# Patient Record
Sex: MALE | Race: WHITE | NOT HISPANIC OR LATINO | Employment: OTHER | URBAN - NONMETROPOLITAN AREA
[De-identification: names, ages, dates, MRNs, and addresses within clinical notes are randomized per-mention and may not be internally consistent; named-entity substitution may affect disease eponyms.]

---

## 2017-01-31 ENCOUNTER — OFFICE VISIT (OUTPATIENT)
Dept: MEDICAL GROUP | Facility: PHYSICIAN GROUP | Age: 57
End: 2017-01-31
Payer: COMMERCIAL

## 2017-01-31 VITALS
WEIGHT: 228 LBS | SYSTOLIC BLOOD PRESSURE: 116 MMHG | RESPIRATION RATE: 14 BRPM | DIASTOLIC BLOOD PRESSURE: 68 MMHG | HEIGHT: 64 IN | HEART RATE: 66 BPM | OXYGEN SATURATION: 95 % | TEMPERATURE: 98.3 F | BODY MASS INDEX: 38.93 KG/M2

## 2017-01-31 DIAGNOSIS — E11.59 TYPE 2 DIABETES MELLITUS WITH OTHER CIRCULATORY COMPLICATION: ICD-10-CM

## 2017-01-31 DIAGNOSIS — E55.9 MILD VITAMIN D DEFICIENCY: ICD-10-CM

## 2017-01-31 DIAGNOSIS — E78.5 HYPERLIPIDEMIA, UNSPECIFIED HYPERLIPIDEMIA TYPE: ICD-10-CM

## 2017-01-31 DIAGNOSIS — E87.6 HYPOKALEMIA: ICD-10-CM

## 2017-01-31 DIAGNOSIS — Z82.49 FAMILY HISTORY OF EARLY CAD: ICD-10-CM

## 2017-01-31 DIAGNOSIS — G45.9 TRANSIENT CEREBRAL ISCHEMIA, UNSPECIFIED TYPE: ICD-10-CM

## 2017-01-31 DIAGNOSIS — I10 ESSENTIAL HYPERTENSION: ICD-10-CM

## 2017-01-31 DIAGNOSIS — G47.30 SLEEP APNEA, UNSPECIFIED TYPE: ICD-10-CM

## 2017-01-31 PROCEDURE — 99214 OFFICE O/P EST MOD 30 MIN: CPT | Performed by: NURSE PRACTITIONER

## 2017-01-31 RX ORDER — SODIUM FLUORIDE 6 MG/ML
PASTE, DENTIFRICE DENTAL
Refills: 3 | COMMUNITY
Start: 2016-11-19 | End: 2021-08-11

## 2017-01-31 ASSESSMENT — PATIENT HEALTH QUESTIONNAIRE - PHQ9: CLINICAL INTERPRETATION OF PHQ2 SCORE: 0

## 2017-01-31 NOTE — MR AVS SNAPSHOT
"        Estiven Singer   2017 4:40 PM   Office Visit   MRN: [id number]    Department:  [facility]   Dept Phone:  [phone]    Description:  Male : 1960   Provider:  YOLY Steward           Reason for Visit     Hospital Follow-up TIA      Allergies as of 2017     No Known Allergies      You were diagnosed with     Essential hypertension   [8124747]       Type 2 diabetes mellitus with other circulatory complication (CMS-[facility])   [8873524]       Sleep apnea, unspecified type   [5380095]       Transient cerebral ischemia, unspecified type   [6426320]       Mild vitamin D deficiency   [256095]       Hypokalemia   [120356]       Hyperlipidemia, unspecified hyperlipidemia type   [2107633]         Vital Signs     Blood Pressure Pulse Temperature Respirations Height Weight    116/68 mmHg 66 36.8 °C (98.3 °F) 14 1.626 m (5' 4\") 103.42 kg (228 lb)    Body Mass Index Oxygen Saturation Smoking Status             39.12 kg/m2 95% Passive Smoke Exposure - Never Smoker         Basic Information     Date Of Birth Sex Race Ethnicity Preferred Language    1960 Male White Non- English      Your appointments     2017  7:30 AM   Adult Draw/Collection with [facility] (--)    [address] [place] [address]   [phone]              Problem List              ICD-10-CM Priority Class Noted - Resolved    HTN (hypertension) I10   2015 - Present    Type 2 diabetes mellitus (CMS-[facility]) E11.9   2015 - Present    Sleep apnea G47.30   2015 - Present    Family history of early CAD Z82.49   2015 - Present    Vertigo of central origin of both ears H81.43   2015 - Present    BMI 38.0-38.9,adult Z68.38   2015 - Present    Mild vitamin D deficiency E55.9   2015 - Present    Hyperlipemia E78.5   2015 - Present    TIA (transient ischemic attack) G45.9   2016 - Present    Hypokalemia E87.6   2016 - Present      Health Maintenance   "       Date Due Completion Dates    IMM HEP B VACCINE (1 of 3 - Primary Series) 1960 ---    DIABETES MONOFILAMENT / LE EXAM 1960 ---    RETINAL SCREENING 5/14/1980 ---    URINE ACR / MICROALBUMIN 5/14/1980 ---    IMM DTaP/Tdap/Td Vaccine (1 - Tdap) 5/14/1981 ---    IMM PNEUMOCOCCAL 19-64 (ADULT) MEDIUM RISK SERIES (1 of 1 - PPSV23) 5/14/1981 ---    COLONOSCOPY 5/14/2012 ---    A1C SCREENING 10/11/2015 4/11/2015, 10/18/2014, 7/18/2014, 4/26/2014, 9/17/2011    IMM INFLUENZA (1) 9/1/2016 ---    FASTING LIPID PROFILE 11/17/2017 11/17/2016, 10/18/2014, 7/18/2014, 1/25/2014, 9/28/2013, 5/25/2013, 10/27/2012, 7/20/2012, 4/13/2012, 1/20/2012, 9/17/2011, 6/18/2011    SERUM CREATININE 11/17/2017 11/17/2016, 4/11/2015, 10/18/2014, 7/18/2014, 4/26/2014, 1/25/2014, 1/18/2014, 9/28/2013, 5/25/2013, 10/27/2012, 7/20/2012, 4/13/2012, 1/20/2012, 9/17/2011, 6/18/2011, 5/14/2010            Current Immunizations     No immunizations on file.      Below and/or attached are the medications your provider expects you to take. Review all of your home medications and newly ordered medications with your provider and/or pharmacist. Follow medication instructions as directed by your provider and/or pharmacist. Please keep your medication list with you and share with your provider. Update the information when medications are discontinued, doses are changed, or new medications (including over-the-counter products) are added; and carry medication information at all times in the event of emergency situations     Allergies:  No Known Allergies          Medications  Valid as of: January 31, 2017 -  5:05 PM    Generic Name Brand Name Tablet Size Instructions for use    AmLODIPine Besylate (Tab) NORVASC 5 MG Take 1 Tab by mouth every day.        Aspirin (Tablet Delayed Response) Aspirin 325 MG Take 1 Tab by mouth every day.        Atorvastatin Calcium (Tab) LIPITOR 40 MG Take 1 Tab by mouth every bedtime.        Lisinopril (Tab) PRINIVIL 20  MG Take 1 Tab by mouth every day.        Sodium Fluoride (Paste) PREVIDENT 5000 BOOSTER PLUS 1.1 %         .                 Medicines prescribed today were sent to:     [id number] - [place], [place] - [address] AT [facility] [address] [facility] [address] [facility]    [address] [facility] [address] [place] [address]    Phone: [phone] Fax: [phone]    Open 24 Hours?: No    [facility] - [place], [place] - [address]    [address] [place] [address]    Phone: [phone] Fax: [phone]    Open 24 Hours?: No      Medication refill instructions:       If your prescription bottle indicates you have medication refills left, it is not necessary to call your provider’s office. Please contact your pharmacy and they will refill your medication.    If your prescription bottle indicates you do not have any refills left, you may request refills at any time through one of the following ways: The online Playdate App system (except Urgent Care), by calling your provider’s office, or by asking your pharmacy to contact your provider’s office with a refill request. Medication refills are processed only during regular business hours and may not be available until the next business day. Your provider may request additional information or to have a follow-up visit with you prior to refilling your medication.   *Please Note: Medication refills are assigned a new Rx number when refilled electronically. Your pharmacy may indicate that no refills were authorized even though a new prescription for the same medication is available at the pharmacy. Please request the medicine by name with the pharmacy before contacting your provider for a refill.        Your To Do List     Future Labs/Procedures Complete By Expires    COMP METABOLIC PANEL  As directed 1/31/2018    HEMOGLOBIN A1C  As directed 1/31/2018    VITAMIN D,25 HYDROXY  As directed 1/31/2018         Playdate App Access Code: [email or web address]  Expires: 3/2/2017 12:08 PM    Delfina ACEVEDO  secure, online tool to manage your health information     [employer] MediaV® is a secure, online tool that connects you to your personalized health information from the privacy of your home -- day or night - making it very easy for you to manage your healthcare. Once the activation process is completed, you can even access your medical information using the MediaV jazmyn, which is available for free in the Apple Jazmyn store or Google Play store.     MediaV provides the following levels of access (as shown below):   My Chart Features   [facility] Primary Care Doctor [facility]  Specialists [facility]  Urgent  Care [facility]  Primary Care  Doctor   Email your healthcare team securely and privately 24/7 X X X    Manage appointments: schedule your next appointment; view details of past/upcoming appointments X      Request prescription refills. X      View recent personal medical records, including lab and immunizations X X X X   View health record, including health history, allergies, medications X X X X   Read reports about your outpatient visits, procedures, consult and ER notes X X X X   See your discharge summary, which is a recap of your hospital and/or ER visit that includes your diagnosis, lab results, and care plan. X X       How to register for MediaV:  1. Go to  [email or web address].  2. Click on the Sign Up Now box, which takes you to the New Member Sign Up page. You will need to provide the following information:  a. Enter your MediaV Access Code exactly as it appears at the top of this page. (You will not need to use this code after you’ve completed the sign-up process. If you do not sign up before the expiration date, you must request a new code.)   b. Enter your date of birth.   c. Enter your home email address.   d. Click Submit, and follow the next screen’s instructions.  3. Create a MediaV ID. This will be your MediaV login ID and cannot be changed, so think of one that is secure and easy to  remember.  4. Create a Snyppit password. You can change your password at any time.  5. Enter your Password Reset Question and Answer. This can be used at a later time if you forget your password.   6. Enter your e-mail address. This allows you to receive e-mail notifications when new information is available in Snyppit.  7. Click Sign Up. You can now view your health information.    For assistance activating your Snyppit account, call [phone]

## 2017-02-01 NOTE — ASSESSMENT & PLAN NOTE
Patient has elevated cholesterol. Patient takes Lipitor 40 mg without side effects.   Diet is unchanged, but he knows that he needs to decrease fats and sugary sweets.

## 2017-02-01 NOTE — ASSESSMENT & PLAN NOTE
Patient reports a isolated episode, where he was watching basketball game, and all of a sudden his vision became blurry and he had one side numbness. He went to ER. Was admitted for observation.  MRI, CT, ECHO and labs were within normal limits for age and chronic elias conditions.  He was told it was probably a TIA.

## 2017-02-01 NOTE — ASSESSMENT & PLAN NOTE
Symptoms:  Headache no , Weakness no , Visual loss no , Chest pain no , Dyspnea no , Claudication no Swelling no   Taking medication Norvasc 5 mg, Lisinopril 20 mg  Diet Standard American Diet. Working on lower carb diet  Exercise: walking in class room

## 2017-02-01 NOTE — ASSESSMENT & PLAN NOTE
Patient was told he may have been diabetic. He was on Metformin at one time.  Eye exam up to date.  No neuropathy  Non smoking  On statin.

## 2017-02-01 NOTE — ASSESSMENT & PLAN NOTE
Patient was hospitalized in November 2016 for blurred vision and nunbness. He eventually had ECHO. Results show 65% EF. Mild calcification on mitral valve. Other wise normal.  No chest pain. No sob. No weakness. No palpitations.

## 2017-02-01 NOTE — PROGRESS NOTES
Chief Complaint   Patient presents with   • Hospital Follow-up     TIA       HISTORY OF PRESENT ILLNESS: Patient is a 56 y.o. male  Patient, who presents today to discuss:    Interval history: ER and overnight stay in hospital.     Review of Systems   Constitutional: Negative for fever, chills, weight loss and malaise/fatigue.   HENT: Negative for ear pain, nosebleeds, congestion, sore throat and neck pain.    Eyes: Negative for blurred vision.   Respiratory: Negative for cough, sputum production, shortness of breath and wheezing.    Cardiovascular: Negative for chest pain, palpitations, orthopnea and leg swelling.   Gastrointestinal: Negative for heartburn, nausea, vomiting and abdominal pain.   Genitourinary: Negative for dysuria, urgency and frequency.   Musculoskeletal: Negative for myalgias, back pain and joint pain.   Skin: Negative for rash and itching.   Neurological: Negative for dizziness, tingling, tremors, sensory change, focal weakness and headaches.   Endo/Heme/Allergies: Does not bruise/bleed easily.   Psychiatric/Behavioral: Negative for depression, anxiety, or memory loss.       HTN (hypertension)  Symptoms:  Headache no , Weakness no , Visual loss no , Chest pain no , Dyspnea no , Claudication no Swelling no   Taking medication Norvasc 5 mg, Lisinopril 20 mg  Diet Standard American Diet. Working on lower carb diet  Exercise: walking in class room      Type 2 diabetes mellitus  Patient was told he may have been diabetic. He was on Metformin at one time.  Eye exam up to date.  No neuropathy  Non smoking  On statin.    Sleep apnea  Patient has current new bed. Patient is using cpap.    TIA (transient ischemic attack)  Patient reports a isolated episode, where he was watching basketball game, and all of a sudden his vision became blurry and he had one side numbness. He went to ER. Was admitted for observation.  MRI, CT, ECHO and labs were within normal limits for age and chronic elias conditions.  He  was told it was probably a TIA.     Mild vitamin D deficiency  Patient is taking vitamin D. Will obtain labs.     Hypokalemia  Patient had history of low potassium    Hyperlipemia  Patient has elevated cholesterol. Patient takes Lipitor 40 mg without side effects.   Diet is unchanged, but he knows that he needs to decrease fats and sugary sweets.     Family history of early CAD  Patient was hospitalized in 2016 for blurred vision and nunbness. He eventually had ECHO. Results show 65% EF. Mild calcification on mitral valve. Other wise normal.  No chest pain. No sob. No weakness. No palpitations.     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         Allergies:Review of patient's allergies indicates no known allergies.    Current Outpatient Prescriptions Ordered in [facility]   Medication Sig Dispense Refill   • aspirin  MG Tablet Delayed Response Take 1 Tab by mouth every day. 60 Tab 3   • atorvastatin (LIPITOR) 40 MG Tab Take 1 Tab by mouth every bedtime. 30 Tab 11   • lisinopril (PRINIVIL) 20 MG Tab Take 1 Tab by mouth every day. 30 Tab 11   • amlodipine (NORVASC) 5 MG Tab Take 1 Tab by mouth every day. 30 Tab 11   • PREVIDENT 5000 BOOSTER PLUS 1.1 % Paste   3     No current [facility]-ordered facility-administered medications on file.       Past Medical History   Diagnosis Date   • Hypertension    • Snoring    • Pain    • Type 2 diabetes mellitus (CMS-[facility]) 2015   • KEVIN (obstructive sleep apnea) 2015   • Vertigo of central origin of both ears 2015   • Hyperlipemia 2015   • Pneumonia      RESOLVED       Social History   Substance Use Topics   • Smoking status: Passive Smoke Exposure - Never Smoker   • Smokeless tobacco: Never Used   • Alcohol Use: 0.0 oz/week     0 Standard drinks or equivalent per week      Comment: seldom       Family Status   Relation Status Death Age   • Mother       MI 50 's  "  • Father     • Maternal Aunt     • Maternal Uncle       Family History   Problem Relation Age of Onset   • Heart Disease Mother    • Cancer Father    • Heart Disease Maternal Aunt    • Heart Disease Maternal Uncle        ROS: As documented in my HPI      Exam:  Blood pressure 116/68, pulse 66, temperature 36.8 °C (98.3 °F), resp. rate 14, height 1.626 m (5' 4\"), weight 103.42 kg (228 lb), SpO2 95 %.  General:  Well nourished, well developed male in NAD  Head: No lesions, Non tender scalp  Neck: Supple. Symmetric   Pulmonary:  Normal effort. No rales, ronchi, or wheezing.  Cardiovascular: Regular rate and rhythm without murmur.   Abdomen: Obese, soft nontender and bowel sounds present.   Extremities: no clubbing, cyanosis, or edema.  Psych: Alert and oriented x3. Normal mood and affect.   Neurological: No focal deficits  No foot exam today    Please note that this dictation was created using voice recognition software. I have made every reasonable attempt to correct obvious errors, but I expect that there are errors of grammar and possibly content that I did not discover before finalizing the note.    Assessment/Plan:  1. Essential hypertension  COMP METABOLIC PANEL    LIPID PANEL    VITAMIN D,25 HYDROXY    TSH+FREE T4    HEMOGLOBIN A1C    MICROALB/CREAT RATIO RAND. UR   2. Type 2 diabetes mellitus with other circulatory complication (HCC)  COMP METABOLIC PANEL    LIPID PANEL    VITAMIN D,25 HYDROXY    TSH+FREE T4    HEMOGLOBIN A1C    MICROALB/CREAT RATIO RAND. UR   3. Sleep apnea, unspecified type  COMP METABOLIC PANEL    LIPID PANEL    VITAMIN D,25 HYDROXY    TSH+FREE T4    HEMOGLOBIN A1C    MICROALB/CREAT RATIO RAND. UR   4. Transient cerebral ischemia, unspecified type  COMP METABOLIC PANEL    LIPID PANEL    VITAMIN D,25 HYDROXY    TSH+FREE T4    HEMOGLOBIN A1C    MICROALB/CREAT RATIO RAND. UR   5. Mild vitamin D deficiency  COMP METABOLIC PANEL    LIPID PANEL    VITAMIN D,25 HYDROXY    " TSH+FREE T4    HEMOGLOBIN A1C    MICROALB/CREAT RATIO RAND. UR   6. Hypokalemia  COMP METABOLIC PANEL    LIPID PANEL    VITAMIN D,25 HYDROXY    TSH+FREE T4    HEMOGLOBIN A1C    MICROALB/CREAT RATIO RAND. UR   7. Hyperlipidemia, unspecified hyperlipidemia type  COMP METABOLIC PANEL    LIPID PANEL    VITAMIN D,25 HYDROXY    TSH+FREE T4    HEMOGLOBIN A1C    MICROALB/CREAT RATIO RAND. UR   8. Family history of early CAD  Normal Echo   9. BMI 38.0-38.9,adult  Discussed diet and exercise

## 2017-02-01 NOTE — ASSESSMENT & PLAN NOTE
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

## 2017-02-11 ENCOUNTER — HOSPITAL ENCOUNTER (OUTPATIENT)
Dept: LAB | Facility: MEDICAL CENTER | Age: 57
End: 2017-02-11
Attending: NURSE PRACTITIONER
Payer: COMMERCIAL

## 2017-02-11 DIAGNOSIS — E78.5 HYPERLIPIDEMIA, UNSPECIFIED HYPERLIPIDEMIA TYPE: ICD-10-CM

## 2017-02-11 DIAGNOSIS — E87.6 HYPOKALEMIA: ICD-10-CM

## 2017-02-11 DIAGNOSIS — E55.9 MILD VITAMIN D DEFICIENCY: ICD-10-CM

## 2017-02-11 DIAGNOSIS — G47.30 SLEEP APNEA, UNSPECIFIED TYPE: ICD-10-CM

## 2017-02-11 DIAGNOSIS — E11.59 TYPE 2 DIABETES MELLITUS WITH OTHER CIRCULATORY COMPLICATION: ICD-10-CM

## 2017-02-11 DIAGNOSIS — G45.9 TRANSIENT CEREBRAL ISCHEMIA, UNSPECIFIED TYPE: ICD-10-CM

## 2017-02-11 DIAGNOSIS — I10 ESSENTIAL HYPERTENSION: ICD-10-CM

## 2017-02-11 LAB
25(OH)D3 SERPL-MCNC: 15 NG/ML (ref 30–100)
ALBUMIN SERPL BCP-MCNC: 4.2 G/DL (ref 3.2–4.9)
ALBUMIN/GLOB SERPL: 1.5 G/DL
ALP SERPL-CCNC: 73 U/L (ref 30–99)
ALT SERPL-CCNC: 18 U/L (ref 2–50)
ANION GAP SERPL CALC-SCNC: 6 MMOL/L (ref 0–11.9)
AST SERPL-CCNC: 16 U/L (ref 12–45)
BILIRUB SERPL-MCNC: 0.6 MG/DL (ref 0.1–1.5)
BUN SERPL-MCNC: 16 MG/DL (ref 8–22)
CALCIUM SERPL-MCNC: 9.5 MG/DL (ref 8.5–10.5)
CHLORIDE SERPL-SCNC: 107 MMOL/L (ref 96–112)
CHOLEST SERPL-MCNC: 119 MG/DL (ref 100–199)
CO2 SERPL-SCNC: 25 MMOL/L (ref 20–33)
CREAT SERPL-MCNC: 0.91 MG/DL (ref 0.5–1.4)
CREAT UR-MCNC: 378.6 MG/DL
EST. AVERAGE GLUCOSE BLD GHB EST-MCNC: 131 MG/DL
GLOBULIN SER CALC-MCNC: 2.8 G/DL (ref 1.9–3.5)
GLUCOSE SERPL-MCNC: 136 MG/DL (ref 65–99)
HBA1C MFR BLD: 6.2 % (ref 0–5.6)
HDLC SERPL-MCNC: 40 MG/DL
LDLC SERPL CALC-MCNC: 66 MG/DL
MICROALBUMIN UR-MCNC: 1.4 MG/DL
MICROALBUMIN/CREAT UR: 4 MG/G (ref 0–30)
POTASSIUM SERPL-SCNC: 4.2 MMOL/L (ref 3.6–5.5)
PROT SERPL-MCNC: 7 G/DL (ref 6–8.2)
SODIUM SERPL-SCNC: 138 MMOL/L (ref 135–145)
T4 FREE SERPL-MCNC: 0.77 NG/DL (ref 0.53–1.43)
TRIGL SERPL-MCNC: 63 MG/DL (ref 0–149)
TSH SERPL DL<=0.005 MIU/L-ACNC: 1.34 UIU/ML (ref 0.3–3.7)

## 2017-02-11 PROCEDURE — 82043 UR ALBUMIN QUANTITATIVE: CPT

## 2017-02-11 PROCEDURE — 82570 ASSAY OF URINE CREATININE: CPT

## 2017-02-11 PROCEDURE — 82306 VITAMIN D 25 HYDROXY: CPT

## 2017-02-11 PROCEDURE — 84439 ASSAY OF FREE THYROXINE: CPT

## 2017-02-11 PROCEDURE — 80061 LIPID PANEL: CPT

## 2017-02-11 PROCEDURE — 36415 COLL VENOUS BLD VENIPUNCTURE: CPT

## 2017-02-11 PROCEDURE — 83036 HEMOGLOBIN GLYCOSYLATED A1C: CPT

## 2017-02-11 PROCEDURE — 80053 COMPREHEN METABOLIC PANEL: CPT

## 2017-02-11 PROCEDURE — 84443 ASSAY THYROID STIM HORMONE: CPT

## 2017-02-12 ENCOUNTER — TELEPHONE (OUTPATIENT)
Dept: MEDICAL GROUP | Facility: PHYSICIAN GROUP | Age: 57
End: 2017-02-12

## 2017-02-13 NOTE — TELEPHONE ENCOUNTER
Reviewed labs.   Stable cholesterol, kidney and liver function.  VITAMIN D level is at 15, please supplement with vitamin D.  Thyroid is normal.    Please notify patient.

## 2017-02-17 ENCOUNTER — TELEPHONE (OUTPATIENT)
Dept: MEDICAL GROUP | Facility: PHYSICIAN GROUP | Age: 57
End: 2017-02-17

## 2017-06-15 ENCOUNTER — SLEEP CENTER VISIT (OUTPATIENT)
Dept: SLEEP MEDICINE | Facility: MEDICAL CENTER | Age: 57
End: 2017-06-15
Payer: COMMERCIAL

## 2017-06-15 VITALS
RESPIRATION RATE: 16 BRPM | SYSTOLIC BLOOD PRESSURE: 126 MMHG | DIASTOLIC BLOOD PRESSURE: 84 MMHG | WEIGHT: 231 LBS | HEIGHT: 64 IN | BODY MASS INDEX: 39.44 KG/M2 | OXYGEN SATURATION: 97 % | TEMPERATURE: 98.6 F | HEART RATE: 89 BPM

## 2017-06-15 DIAGNOSIS — G47.33 OSA (OBSTRUCTIVE SLEEP APNEA): ICD-10-CM

## 2017-06-15 PROCEDURE — 99213 OFFICE O/P EST LOW 20 MIN: CPT | Performed by: NURSE PRACTITIONER

## 2017-06-15 NOTE — PROGRESS NOTES
Chief Complaint   Patient presents with   • Apnea     Last Seen 11/15         HPI:  This is a 57 y.o. male with a history of obstructive sleep apnea.  Polysomnogram indicates AHI 76 and minimum saturation 74%. The patient is compliant with APAP 8-15 centimeters. Compliance dated 5/13-6/11/17 indicate 73% usage for 4 hours 48 minutes. Age has been reduced to 2.5. Average pressure is 13.5 cm. The patient reports being rested and benefiting from therapy. There've been no changes to his health in the past 2 years. He denies early morning headaches and lower extremity edema.    Past Medical History   Diagnosis Date   • Hypertension    • Snoring    • Pain    • Type 2 diabetes mellitus (CMS-[facility]) 1/8/2015   • KEVIN (obstructive sleep apnea) 1/8/2015   • Vertigo of central origin of both ears 1/9/2015   • Hyperlipemia 4/29/2015   • Pneumonia      RESOLVED       Past Surgical History   Procedure Laterality Date   • Shoulder arthroscopy  5/20/2010     Performed by XIMENA KANG at SURGERY SAME DAY [facility]   • Debridement  5/20/2010     Performed by XIMENA KANG at SURGERY SAME DAY [facility]   • Rotator cuff repair  5/20/2010     Performed by XIMENA KANG at SURGERY SAME DAY [facility]       Social History   Substance Use Topics   • Smoking status: Passive Smoke Exposure - Never Smoker   • Smokeless tobacco: Never Used   • Alcohol Use: 0.0 oz/week     0 Standard drinks or equivalent per week      Comment: seldom       ROS:   Constitutional: Denies fevers, chills, sweats, fatigue, and weight loss.  Eyes: Glasses.  Ears/nose/mouth/throat: Denies injury.  Cardiovascular: Denies chest pain, tightness.  Respiratory: Denies shortness of breath, cough, sputum, wheezing, hemoptysis.  GI: Denies heartburn, difficulty swallowing, nausea, and vomiting.  Neurological: Denies frequent headaches, dizziness, weakness.  Sleep: See history of present illness.    Vitals:  Filed Vitals:    06/15/17 1354   Height: 1.626 m  "(5' 4\")   Weight: 104.781 kg (231 lb)   Weight % change since last entry.: 0 %   BP: 126/84   Pulse: 89   BMI (Calculated): 39.65   Resp: 16   Temp: 37 °C (98.6 °F)   O2 sat % room air: 97 %     Allergies:  Review of patient's allergies indicates no known allergies.    Medications.  Current Outpatient Prescriptions   Medication Sig Dispense Refill   • PREVIDENT 5000 BOOSTER PLUS 1.1 % Paste   3   • aspirin  MG Tablet Delayed Response Take 1 Tab by mouth every day. 60 Tab 3   • atorvastatin (LIPITOR) 40 MG Tab Take 1 Tab by mouth every bedtime. 30 Tab 11   • lisinopril (PRINIVIL) 20 MG Tab Take 1 Tab by mouth every day. 30 Tab 11   • amlodipine (NORVASC) 5 MG Tab Take 1 Tab by mouth every day. 30 Tab 11     No current facility-administered medications for this visit.       PHYSICAL EXAM:  Appearance: Well-developed, well-nourished, no acute distress.  Eyes. PERRL.  Hearing: Grossly intact.  Oropharynx: Tongue normal, posterior pharynx without erythema or exudate.  Respiratory effort: No intercostal retractions or use of accessory muscles.  Lung auscultation: No crackles, wheezing.  Heart auscultation: No murmur, gallop, or rub. Regular rate and rhythm.  Extremities: No cyanosis or edema.  Gait and Station: Normal  Orientation: Oriented to time, place, and person.    Assessment:  No diagnosis found.      Plan:  1. Continue APAP 8-15 centimeters.  2. Clean equipment weekly replacement supplies as a insurance.  3. Mask and supplies to benefit medical.    Return in about 1 year (around 6/15/2018) for With ALFA Escobar.      "

## 2017-06-15 NOTE — MR AVS SNAPSHOT
"Estiven Singer   6/15/2017 2:00 PM   Sleep Center Visit   MRN: [id number]    Department:  [facility]   Dept Phone:  [phone]    Description:  Male : 1960   Provider:  YOLY Suarez           Reason for Visit     Apnea Last Seen 11/15      Allergies as of 6/15/2017     No Known Allergies      You were diagnosed with     KEVIN (obstructive sleep apnea)   [180886]         Vital Signs     Blood Pressure Pulse Temperature Respirations Height Weight    126/84 mmHg 89 37 °C (98.6 °F) 16 1.626 m (5' 4\") 104.781 kg (231 lb)    Body Mass Index Oxygen Saturation Smoking Status             39.63 kg/m2 97% Passive Smoke Exposure - Never Smoker         Basic Information     Date Of Birth Sex Race Ethnicity Preferred Language    1960 Male White Non- English      Your appointments     2018 10:20 AM   Follow UP with YOLY Suarez   [facility] (--)    [address] [place] [id number] [phone]              Problem List              ICD-10-CM Priority Class Noted - Resolved    HTN (hypertension) I10 High  2015 - Present    Type 2 diabetes mellitus (CMS-HCC) E11.9   2015 - Present    KEVIN (obstructive sleep apnea) G47.33   2015 - Present    Family history of early CAD Z82.49 Medium  2015 - Present    Vertigo of central origin of both ears H81.43 Medium  2015 - Present    BMI 38.0-38.9,adult Z68.38 Medium  2015 - Present    Mild vitamin D deficiency E55.9   2015 - Present    Hyperlipemia E78.5 High  2015 - Present    TIA (transient ischemic attack) G45.9   2016 - Present    Hypokalemia E87.6   2016 - Present      Health Maintenance        Date Due Completion Dates    IMM HEP B VACCINE (1 of 3 - Primary Series) 1960 ---    DIABETES MONOFILAMENT / LE EXAM 1960 ---    RETINAL SCREENING 1980 ---    IMM DTaP/Tdap/Td Vaccine (1 - Tdap) 1981 ---    IMM PNEUMOCOCCAL 19- " (ADULT) MEDIUM RISK SERIES (1 of 1 - PPSV23) 5/14/1981 ---    COLONOSCOPY 5/14/2012 ---    A1C SCREENING 8/11/2017 2/11/2017, 4/11/2015, 10/18/2014, 7/18/2014, 4/26/2014, 9/17/2011    FASTING LIPID PROFILE 2/11/2018 2/11/2017, 11/17/2016, 10/18/2014, 7/18/2014, 1/25/2014, 9/28/2013, 5/25/2013, 10/27/2012, 7/20/2012, 4/13/2012, 1/20/2012, 9/17/2011, 6/18/2011    URINE ACR / MICROALBUMIN 2/11/2018 2/11/2017    SERUM CREATININE 2/11/2018 2/11/2017, 11/17/2016, 4/11/2015, 10/18/2014, 7/18/2014, 4/26/2014, 1/25/2014, 1/18/2014, 9/28/2013, 5/25/2013, 10/27/2012, 7/20/2012, 4/13/2012, 1/20/2012, 9/17/2011, 6/18/2011, 5/14/2010            Current Immunizations     No immunizations on file.      Below and/or attached are the medications your provider expects you to take. Review all of your home medications and newly ordered medications with your provider and/or pharmacist. Follow medication instructions as directed by your provider and/or pharmacist. Please keep your medication list with you and share with your provider. Update the information when medications are discontinued, doses are changed, or new medications (including over-the-counter products) are added; and carry medication information at all times in the event of emergency situations     Allergies:  No Known Allergies          Medications  Valid as of: Catie 15, 2017 -  2:17 PM    Generic Name Brand Name Tablet Size Instructions for use    AmLODIPine Besylate (Tab) NORVASC 5 MG Take 1 Tab by mouth every day.        Aspirin (Tablet Delayed Response) Aspirin 325 MG Take 1 Tab by mouth every day.        Atorvastatin Calcium (Tab) LIPITOR 40 MG Take 1 Tab by mouth every bedtime.        Lisinopril (Tab) PRINIVIL 20 MG Take 1 Tab by mouth every day.        Sodium Fluoride (Paste) PREVIDENT 5000 BOOSTER PLUS 1.1 %         .                 Medicines prescribed today were sent to:     [id number] - [place], [place] - [address] [facility] [address]  [address] [place] [address]    Phone: [phone] Fax: [phone]    Open 24 Hours?: No      Medication refill instructions:       If your prescription bottle indicates you have medication refills left, it is not necessary to call your provider’s office. Please contact your pharmacy and they will refill your medication.    If your prescription bottle indicates you do not have any refills left, you may request refills at any time through one of the following ways: The online OneCubicle system (except Urgent Care), by calling your provider’s office, or by asking your pharmacy to contact your provider’s office with a refill request. Medication refills are processed only during regular business hours and may not be available until the next business day. Your provider may request additional information or to have a follow-up visit with you prior to refilling your medication.   *Please Note: Medication refills are assigned a new Rx number when refilled electronically. Your pharmacy may indicate that no refills were authorized even though a new prescription for the same medication is available at the pharmacy. Please request the medicine by name with the pharmacy before contacting your provider for a refill.        Instructions    1. Continue APAP 8-15 centimeters.  2. Clean equipment weekly replacement supplies as a insurance.  3. Mask and supplies to benefit medical.            OneCubicle Access Code: [email or web address]  Expires: 7/14/2017 10:52 AM    OneCubicle  A secure, online tool to manage your health information     [employer]’s OneCubicle® is a secure, online tool that connects you to your personalized health information from the privacy of your home -- day or night - making it very easy for you to manage your healthcare. Once the activation process is completed, you can even access your medical information using the OneCubicle jazmyn, which is available for free in the Apple Jazmyn store or Google Play  store.     Graematter provides the following levels of access (as shown below):   My Chart Features   [facility] Doctor [facility]  Specialists [facility]  Urgent  Care Non-[facility]  Primary Care  Doctor   Email your healthcare team securely and privately 24/7 X X X    Manage appointments: schedule your next appointment; view details of past/upcoming appointments X      Request prescription refills. X      View recent personal medical records, including lab and immunizations X X X X   View health record, including health history, allergies, medications X X X X   Read reports about your outpatient visits, procedures, consult and ER notes X X X X   See your discharge summary, which is a recap of your hospital and/or ER visit that includes your diagnosis, lab results, and care plan. X X       How to register for Graematter:  1. Go to  [email or web address].  2. Click on the Sign Up Now box, which takes you to the New Member Sign Up page. You will need to provide the following information:  a. Enter your Graematter Access Code exactly as it appears at the top of this page. (You will not need to use this code after you’ve completed the sign-up process. If you do not sign up before the expiration date, you must request a new code.)   b. Enter your date of birth.   c. Enter your home email address.   d. Click Submit, and follow the next screen’s instructions.  3. Create a Graematter ID. This will be your Graematter login ID and cannot be changed, so think of one that is secure and easy to remember.  4. Create a Graematter password. You can change your password at any time.  5. Enter your Password Reset Question and Answer. This can be used at a later time if you forget your password.   6. Enter your e-mail address. This allows you to receive e-mail notifications when new information is available in Graematter.  7. Click Sign Up. You can now view your health information.    For assistance activating your Graematter account, call [phone]

## 2017-06-15 NOTE — PATIENT INSTRUCTIONS
1. Continue APAP 8-15 centimeters.  2. Clean equipment weekly replacement supplies as a insurance.  3. Mask and supplies to benefit medical.

## 2017-11-28 NOTE — ASSESSMENT & PLAN NOTE
Patient has known htn. He is prescribed norvasc 5 mg and lisinopril 20 mg daily. Patient is due for updated CMP  Symptoms:  Headache no , Weakness no , Visual loss, Chest pain no , Dyspnea no , Claudication no Swelling no   Taking medication yes - norvasc - lisinopril   Diet Standard American Diet   Exercise: working at school

## 2017-11-28 NOTE — ASSESSMENT & PLAN NOTE
Patient has history of type 2 diabetes and does not take medications. Will recheck A1c  MEDICATIONS:  DIET only     HGBA1C -  6.6 - without medications. Planning to start checking blood sugars and will report.     Meter?   No     Do you need refills of lancets, strips no     L : D take a statin for your cholesterol yes     U:  Last urine micro due     C: The you have circulation problems no  Pain no     O: Last eye exam 2017     S: Smoker no     E: Exercise active  ACE inhibitor yes.     Monitor is ordered. Patient to obtain fasting and pp blood sugars.

## 2017-11-28 NOTE — ASSESSMENT & PLAN NOTE
Patient has chronic hyperlipidemia. He is prescribed Lipitor 40 mg daily. Need new profile and CMP.

## 2017-11-29 ENCOUNTER — OFFICE VISIT (OUTPATIENT)
Dept: MEDICAL GROUP | Facility: PHYSICIAN GROUP | Age: 57
End: 2017-11-29
Payer: COMMERCIAL

## 2017-11-29 VITALS
OXYGEN SATURATION: 96 % | HEIGHT: 64 IN | TEMPERATURE: 98.6 F | RESPIRATION RATE: 20 BRPM | BODY MASS INDEX: 38.99 KG/M2 | HEART RATE: 76 BPM | DIASTOLIC BLOOD PRESSURE: 86 MMHG | SYSTOLIC BLOOD PRESSURE: 128 MMHG | WEIGHT: 228.4 LBS

## 2017-11-29 DIAGNOSIS — G45.9 TRANSIENT CEREBRAL ISCHEMIA, UNSPECIFIED TYPE: ICD-10-CM

## 2017-11-29 DIAGNOSIS — I10 ESSENTIAL HYPERTENSION: ICD-10-CM

## 2017-11-29 DIAGNOSIS — Z12.5 SCREENING FOR PROSTATE CANCER: ICD-10-CM

## 2017-11-29 DIAGNOSIS — E78.5 HYPERLIPIDEMIA, UNSPECIFIED HYPERLIPIDEMIA TYPE: ICD-10-CM

## 2017-11-29 DIAGNOSIS — E55.9 MILD VITAMIN D DEFICIENCY: ICD-10-CM

## 2017-11-29 DIAGNOSIS — E11.9 TYPE 2 DIABETES MELLITUS WITHOUT COMPLICATION, WITHOUT LONG-TERM CURRENT USE OF INSULIN (HCC): ICD-10-CM

## 2017-11-29 DIAGNOSIS — Z12.11 ENCOUNTER FOR FECAL IMMUNOCHEMICAL TEST SCREENING: ICD-10-CM

## 2017-11-29 PROCEDURE — 99214 OFFICE O/P EST MOD 30 MIN: CPT | Performed by: NURSE PRACTITIONER

## 2017-11-29 RX ORDER — LANCETS 30 GAUGE
EACH MISCELLANEOUS
Qty: 100 EACH | Refills: 3 | Status: SHIPPED | OUTPATIENT
Start: 2017-11-29 | End: 2021-08-11

## 2017-11-29 NOTE — PROGRESS NOTES
Chief Complaint   Patient presents with   • Medication Refill     no concerns-medication working great       HISTORY OF PRESENT ILLNESS: Patient is a @ age 57 here  today to discuss:     Interval history:     Hospitalizations  : none     Injuries : none     Illness  : none        HTN (hypertension)  Patient has known htn. He is prescribed norvasc 5 mg and lisinopril 20 mg daily. Patient is due for updated CMP  Symptoms:  Headache no , Weakness no , Visual loss, Chest pain no , Dyspnea no , Claudication no Swelling no   Taking medication yes - norvasc - lisinopril   Diet Standard American Diet   Exercise: working at school       Hyperlipemia  Patient has chronic hyperlipidemia. He is prescribed Lipitor 40 mg daily. Need new profile and CMP.     Type 2 diabetes mellitus  Patient has history of type 2 diabetes and does not take medications. Will recheck A1c  MEDICATIONS:  DIET only     HGBA1C -  6.6 - without medications. Planning to start checking blood sugars and will report.     Meter?   No     Do you need refills of lancets, strips no     L : D take a statin for your cholesterol yes     U:  Last urine micro due     C: The you have circulation problems no  Pain no     O: Last eye exam 2017     S: Smoker no     E: Exercise active  ACE inhibitor yes.     Monitor is ordered. Patient to obtain fasting and pp blood sugars.       Mild vitamin D deficiency  Patient has history of vitamin D insufficiency. Will need to recheck.     TIA (transient ischemic attack)  Patient takes one asa 325 daily. No headache. No confusion. No weakness.    Patient declines FLU VACCINE. Reviewed .     Allergies:Patient has no known allergies.    Current Outpatient Prescriptions Ordered in [facility]   Medication Sig Dispense Refill   • Blood Glucose Monitoring Suppl Device Meter: Dispense Device of Insurance Preference. Sig. Use as directed for blood sugar monitoring. #1. NR. 1 Device 0   • Blood Glucose Monitoring Suppl Supplies Misc Test strips  "order: Test strips for patient's meter. Sig: use tid and prn ssx high or low sugar #100 RF x 3 100 Strip 3   • Lancets Misc Lancets order: Lancets for  meter. Sig: use fasting and prn ssx high or low sugar. #100 RF x 3 100 Each 3   • PREVIDENT 5000 BOOSTER PLUS 1.1 % Paste   3   • aspirin  MG Tablet Delayed Response Take 1 Tab by mouth every day. 60 Tab 3   • atorvastatin (LIPITOR) 40 MG Tab Take 1 Tab by mouth every bedtime. 30 Tab 11   • lisinopril (PRINIVIL) 20 MG Tab Take 1 Tab by mouth every day. 30 Tab 11   • amlodipine (NORVASC) 5 MG Tab Take 1 Tab by mouth every day. 30 Tab 11     No current [facility]-ordered facility-administered medications on file.        Past Medical History:   Diagnosis Date   • Hyperlipemia 2015   • Hypertension    • KEVIN (obstructive sleep apnea) 2015   • Pain    • Pneumonia     RESOLVED   • Snoring    • Type 2 diabetes mellitus (CMS-HCC) 2015   • Vertigo of central origin of both ears 2015       Social History   Substance Use Topics   • Smoking status: Passive Smoke Exposure - Never Smoker   • Smokeless tobacco: Never Used   • Alcohol use 0.6 oz/week     1 Glasses of wine per week      Comment: seldom       Family Status   Relation Status   • Mother     MI 50 's   • Father    • Maternal Aunt    • Maternal Uncle      Family History   Problem Relation Age of Onset   • Heart Disease Mother    • Cancer Father    • Heart Disease Maternal Aunt    • Heart Disease Maternal Uncle        ROS: As documented in my HPI      Exam:  Blood pressure 128/86, pulse 76, temperature 37 °C (98.6 °F), resp. rate 20, height 1.626 m (5' 4\"), weight 103.6 kg (228 lb 6.4 oz), SpO2 96 %.  General:  Well nourished, well developed male in NAD  Head: Nontender scalp. No lesions  Neck: Supple. Symmetric Thyroid palpated. No bruits  Pulmonary:  Normal effort. No rales, ronchi, or wheezing.  Cardiovascular: Regular rate and rhythm without murmur.   Abdomen: Soft " nontender, normal bowel sounds  Extremities: no clubbing, cyanosis, or edema.  Psych: Alert and oriented x3.  Monofilament testing with a 10 gram force: sensation: intact bilaterally  Visual Inspection: Feet without maceration, ulcers, or fissures.  Pedal pulses: intact bilaterally   Left foot with bunion  Neurological: No focal deficits    Please note that this dictation was created using voice recognition software. I have made every reasonable attempt to correct obvious errors, but I expect that there are errors of grammar and possibly content that I did not discover before finalizing the note.    Assessment/Plan:  1. Essential hypertension - Current status of condition is chronic and controlled on therapy.   COMP METABOLIC PANEL   2. Hyperlipidemia, unspecified hyperlipidemia type  LIPID PANEL    VITAMIN D,25 HYDROXY   3. Type 2 diabetes mellitus without complication, without long-term current use of insulin (CMS-[facility]) - Current status of condition is chronic and controlled on therapy.   TSH+FREE T4    POCT  A1C     DIABETES FOOT EXAM DONE   4. Mild vitamin D deficiency  VITAMIN D,25 HYDROXY    TSH+FREE T4   5. Screening for prostate cancer  PROSTATE SPECIFIC AG SCREENING   6. Encounter for fecal immunochemical test screening  OCCULT BLOOD FECES IMMUNOASSAY   7. Transient cerebral ischemia, unspecified type  No episodes

## 2017-11-29 NOTE — PATIENT INSTRUCTIONS
Please start checking blood sugars.     In AM fasting should be less than 100    AFTER Meals 1-2 hours less than 150.    Return in 3 months with your readings.

## 2017-11-30 NOTE — TELEPHONE ENCOUNTER
Was the patient seen in the last year in this department? Yes     Does patient have an active prescription for medications requested? No     Received Request Via: Pharmacy    Pt met protocol?: Yes     Last OV 11/29/17   BP Readings from Last 1 Encounters:   11/29/17 128/86       Lab Results   Component Value Date/Time    LDLCHOL 149 (H) 04/26/2014 10:39 AM    HDL 40 02/11/2017 07:33 AM

## 2017-12-01 RX ORDER — ASPIRIN 325 MG
325 TABLET, DELAYED RELEASE (ENTERIC COATED) ORAL DAILY
Qty: 90 TAB | Refills: 0 | Status: SHIPPED | OUTPATIENT
Start: 2017-12-01 | End: 2018-03-22 | Stop reason: SDUPTHER

## 2017-12-01 RX ORDER — LISINOPRIL 20 MG/1
20 TABLET ORAL DAILY
Qty: 90 TAB | Refills: 0 | Status: SHIPPED | OUTPATIENT
Start: 2017-12-01 | End: 2018-03-22 | Stop reason: SDUPTHER

## 2017-12-01 RX ORDER — AMLODIPINE BESYLATE 5 MG/1
5 TABLET ORAL DAILY
Qty: 90 TAB | Refills: 0 | Status: SHIPPED | OUTPATIENT
Start: 2017-12-01 | End: 2018-03-22 | Stop reason: SDUPTHER

## 2017-12-01 RX ORDER — ATORVASTATIN CALCIUM 40 MG/1
40 TABLET, FILM COATED ORAL
Qty: 90 TAB | Refills: 0 | Status: SHIPPED | OUTPATIENT
Start: 2017-12-01 | End: 2020-04-21 | Stop reason: SDUPTHER

## 2018-03-03 ENCOUNTER — HOSPITAL ENCOUNTER (OUTPATIENT)
Dept: LAB | Facility: MEDICAL CENTER | Age: 58
End: 2018-03-03
Attending: NURSE PRACTITIONER
Payer: COMMERCIAL

## 2018-03-03 DIAGNOSIS — E55.9 MILD VITAMIN D DEFICIENCY: ICD-10-CM

## 2018-03-03 DIAGNOSIS — Z12.5 SCREENING FOR PROSTATE CANCER: ICD-10-CM

## 2018-03-03 DIAGNOSIS — I10 ESSENTIAL HYPERTENSION: ICD-10-CM

## 2018-03-03 DIAGNOSIS — E78.5 HYPERLIPIDEMIA, UNSPECIFIED HYPERLIPIDEMIA TYPE: ICD-10-CM

## 2018-03-03 LAB
25(OH)D3 SERPL-MCNC: 15 NG/ML (ref 30–100)
ALBUMIN SERPL BCP-MCNC: 4.6 G/DL (ref 3.2–4.9)
ALBUMIN/GLOB SERPL: 1.8 G/DL
ALP SERPL-CCNC: 68 U/L (ref 30–99)
ALT SERPL-CCNC: 20 U/L (ref 2–50)
ANION GAP SERPL CALC-SCNC: 8 MMOL/L (ref 0–11.9)
AST SERPL-CCNC: 16 U/L (ref 12–45)
BILIRUB SERPL-MCNC: 0.5 MG/DL (ref 0.1–1.5)
BUN SERPL-MCNC: 14 MG/DL (ref 8–22)
CALCIUM SERPL-MCNC: 8.6 MG/DL (ref 8.5–10.5)
CHLORIDE SERPL-SCNC: 107 MMOL/L (ref 96–112)
CHOLEST SERPL-MCNC: 132 MG/DL (ref 100–199)
CO2 SERPL-SCNC: 26 MMOL/L (ref 20–33)
CREAT SERPL-MCNC: 0.87 MG/DL (ref 0.5–1.4)
GLOBULIN SER CALC-MCNC: 2.5 G/DL (ref 1.9–3.5)
GLUCOSE SERPL-MCNC: 117 MG/DL (ref 65–99)
HDLC SERPL-MCNC: 46 MG/DL
LDLC SERPL CALC-MCNC: 67 MG/DL
POTASSIUM SERPL-SCNC: 3.7 MMOL/L (ref 3.6–5.5)
PROT SERPL-MCNC: 7.1 G/DL (ref 6–8.2)
PSA SERPL-MCNC: 1.73 NG/ML (ref 0–4)
SODIUM SERPL-SCNC: 141 MMOL/L (ref 135–145)
T4 FREE SERPL-MCNC: 0.86 NG/DL (ref 0.53–1.43)
TRIGL SERPL-MCNC: 94 MG/DL (ref 0–149)
TSH SERPL DL<=0.005 MIU/L-ACNC: 1.49 UIU/ML (ref 0.38–5.33)

## 2018-03-03 PROCEDURE — 84443 ASSAY THYROID STIM HORMONE: CPT

## 2018-03-03 PROCEDURE — 80053 COMPREHEN METABOLIC PANEL: CPT

## 2018-03-03 PROCEDURE — 84439 ASSAY OF FREE THYROXINE: CPT

## 2018-03-03 PROCEDURE — 80061 LIPID PANEL: CPT

## 2018-03-03 PROCEDURE — 82306 VITAMIN D 25 HYDROXY: CPT

## 2018-03-03 PROCEDURE — 84153 ASSAY OF PSA TOTAL: CPT

## 2018-03-03 PROCEDURE — 36415 COLL VENOUS BLD VENIPUNCTURE: CPT

## 2018-03-06 ENCOUNTER — HOSPITAL ENCOUNTER (OUTPATIENT)
Facility: MEDICAL CENTER | Age: 58
End: 2018-03-06
Attending: NURSE PRACTITIONER
Payer: COMMERCIAL

## 2018-03-06 PROCEDURE — 82274 ASSAY TEST FOR BLOOD FECAL: CPT

## 2018-03-07 DIAGNOSIS — Z12.11 ENCOUNTER FOR FECAL IMMUNOCHEMICAL TEST SCREENING: ICD-10-CM

## 2018-03-08 NOTE — ASSESSMENT & PLAN NOTE
Patient is diet controlled only with his type 2 diabetes.  Fasting blood sugar is 117. Patient is changing lifestyles. Exercising and eating more vegetable and fruit.

## 2018-03-08 NOTE — ASSESSMENT & PLAN NOTE
Current level is 15. Not much change from last labs. Patient recommended to start OTC 5,000 units daily.

## 2018-03-08 NOTE — ASSESSMENT & PLAN NOTE
Results for SARAH AYOUB (MRN [id number]) as of 3/8/2018 13:06    Taking atorvastatin 40 mg daily. No side effects. Chemistry is normal.       Patient is taking Lipitor 40 mg daily.    Ref. Range 3/3/2018 10:32   Cholesterol,Tot Latest Ref Range: 100 - 199 mg/dL 132   Triglycerides Latest Ref Range: 0 - 149 mg/dL 94   HDL Latest Ref Range: >=40 mg/dL 46   LDL Latest Ref Range: <100 mg/dL 67

## 2018-03-08 NOTE — ASSESSMENT & PLAN NOTE
Results for SARAH AYOUB (MRN [id number]) as of 3/8/2018 13:06    Patient is taking amlodipine 5 mg and Lisinopril 20 mg daily.  No side effects.  Attempting exercise and better eating.  No chest pain  No sob  No peripheral edema.   Ref. Range 3/3/2018 10:32   Bun Latest Ref Range: 8 - 22 mg/dL 14   Creatinine Latest Ref Range: 0.50 - 1.40 mg/dL 0.87   GFR If  Latest Ref Range: >60 mL/min/1.73 m 2 >60   GFR If Non  Latest Ref Range: >60 mL/min/1.73 m 2 >60

## 2018-03-09 ENCOUNTER — OFFICE VISIT (OUTPATIENT)
Dept: MEDICAL GROUP | Facility: PHYSICIAN GROUP | Age: 58
End: 2018-03-09
Payer: COMMERCIAL

## 2018-03-09 VITALS
RESPIRATION RATE: 12 BRPM | HEART RATE: 74 BPM | TEMPERATURE: 98.6 F | BODY MASS INDEX: 38.58 KG/M2 | HEIGHT: 64 IN | DIASTOLIC BLOOD PRESSURE: 88 MMHG | SYSTOLIC BLOOD PRESSURE: 136 MMHG | WEIGHT: 226 LBS | OXYGEN SATURATION: 98 %

## 2018-03-09 DIAGNOSIS — E11.9 TYPE 2 DIABETES MELLITUS WITHOUT COMPLICATION, WITHOUT LONG-TERM CURRENT USE OF INSULIN (HCC): ICD-10-CM

## 2018-03-09 DIAGNOSIS — E55.9 VITAMIN D INSUFFICIENCY: ICD-10-CM

## 2018-03-09 DIAGNOSIS — E78.5 HYPERLIPIDEMIA, UNSPECIFIED HYPERLIPIDEMIA TYPE: ICD-10-CM

## 2018-03-09 DIAGNOSIS — I10 ESSENTIAL HYPERTENSION: ICD-10-CM

## 2018-03-09 DIAGNOSIS — L98.9 SKIN LESION: ICD-10-CM

## 2018-03-09 LAB — HEMOCCULT STL QL IA: NEGATIVE

## 2018-03-09 PROCEDURE — 99214 OFFICE O/P EST MOD 30 MIN: CPT | Performed by: NURSE PRACTITIONER

## 2018-03-09 ASSESSMENT — PATIENT HEALTH QUESTIONNAIRE - PHQ9: CLINICAL INTERPRETATION OF PHQ2 SCORE: 0

## 2018-03-09 NOTE — ASSESSMENT & PLAN NOTE
Patient reports a small lesion on upper thigh. The lesion started as a freckle and now the contour has changed. No family history.

## 2018-03-12 NOTE — PROGRESS NOTES
Chief Complaint   Patient presents with   • Hypertension     FV    • Other     Growth on leg been getting bigger        HISTORY OF PRESENT ILLNESS: Patient is a @ age 57 here  today to discuss:     Interval history:     Hospitalizations NO     Injuries NO    Illness NO        Hyperlipemia  Results for SARAH AYOUB (MRN [id number]) as of 3/8/2018 13:06    Taking atorvastatin 40 mg daily. No side effects. Chemistry is normal.       Patient is taking Lipitor 40 mg daily.    Ref. Range 3/3/2018 10:32   Cholesterol,Tot Latest Ref Range: 100 - 199 mg/dL 132   Triglycerides Latest Ref Range: 0 - 149 mg/dL 94   HDL Latest Ref Range: >=40 mg/dL 46   LDL Latest Ref Range: <100 mg/dL 67       HTN (hypertension)  Results for SARAH AYOUB (MRN [id number]) as of 3/8/2018 13:06    Patient is taking amlodipine 5 mg and Lisinopril 20 mg daily.  No side effects.  Attempting exercise and better eating.  No chest pain  No sob  No peripheral edema.   Ref. Range 3/3/2018 10:32   Bun Latest Ref Range: 8 - 22 mg/dL 14   Creatinine Latest Ref Range: 0.50 - 1.40 mg/dL 0.87   GFR If  Latest Ref Range: >60 mL/min/1.73 m 2 >60   GFR If Non  Latest Ref Range: >60 mL/min/1.73 m 2 >60       Vitamin D insufficiency  Current level is 15. Not much change from last labs. Patient recommended to start OTC 5,000 units daily.     Type 2 diabetes mellitus  Patient is diet controlled only with his type 2 diabetes.  Fasting blood sugar is 117. Patient is changing lifestyles. Exercising and eating more vegetable and fruit.     Skin lesion  Patient reports a small lesion on upper thigh. The lesion started as a freckle and now the contour has changed. No family history.     BMI 38.0-38.9,adult  Patient tries to be active. Full-time .        Allergies:Patient has no known allergies.    Current Outpatient Prescriptions Ordered in [facility]   Medication Sig Dispense Refill   • atorvastatin (LIPITOR) 40 MG Tab Take 1 Tab by  "mouth every bedtime. 90 Tab 0   • amlodipine (NORVASC) 5 MG Tab Take 1 Tab by mouth every day. 90 Tab 0   • Aspirin 325 MG Tablet Delayed Response Take 1 Tab by mouth every day. 90 Tab 0   • lisinopril (PRINIVIL) 20 MG Tab Take 1 Tab by mouth every day. 90 Tab 0   • Blood Glucose Monitoring Suppl Device Meter: Dispense Device of Insurance Preference. Sig. Use as directed for blood sugar monitoring. #1. NR. 1 Device 0   • Blood Glucose Monitoring Suppl Supplies Misc Test strips order: Test strips for patient's meter. Sig: use tid and prn ssx high or low sugar #100 RF x 3 100 Strip 3   • Lancets Misc Lancets order: Lancets for  meter. Sig: use fasting and prn ssx high or low sugar. #100 RF x 3 100 Each 3   • PREVIDENT 5000 BOOSTER PLUS 1.1 % Paste   3     No current [facility]-ordered facility-administered medications on file.        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6 °F), resp. rate 12, height 1.626 m (5' 4\"), weight 102.5 kg (226 lb), SpO2 98 %.  General:  Well nourished, well developed male in NAD  Head: Nontender scalp. No lesions  Neck: Supple. Symmetric Thyroid " palpated. No bruits  Pulmonary:  Normal effort. No rales, ronchi, or wheezing.  Cardiovascular: Regular rate and rhythm without murmur.   Abdomen: Soft nontender, normal bowel sounds  Extremities: no clubbing, cyanosis, or edema.  Psych: Alert and oriented x3.  Skin: right leg - freckled pigmented rough lesion. Enlarged 1.5 cm  Neurological: No focal deficits  Monofilament testing with a 10 gram force: sensation: intact bilaterally  Visual Inspection: Feet without maceration, ulcers, or fissures.  Pedal pulses: intact bilaterally      Please note that this dictation was created using voice recognition software. I have made every reasonable attempt to correct obvious errors, but I expect that there are errors of grammar and possibly content that I did not discover before finalizing the note.    Assessment/Plan:  1. Hyperlipidemia, unspecified hyperlipidemia type   Current status of condition is chronic and controlled on therapy.     2. Essential hypertension   Current status of condition is chronic and controlled on therapy.     3. Vitamin D insufficiency   Not controlled. Recommended OTC and recheck   4. Type 2 diabetes mellitus without complication, without long-term current use of insulin (CMS-[facility])   Current status of condition is chronic and controlled on diet only     5. Skin lesion  REFERRAL TO DERMATOLOGY   6. BMI 38.0-38.9,adult   counseled.         Return in 3 months for recheck.

## 2018-03-23 RX ORDER — AMLODIPINE BESYLATE 5 MG/1
TABLET ORAL
Qty: 90 TAB | Refills: 1 | Status: SHIPPED | OUTPATIENT
Start: 2018-03-23 | End: 2018-09-09 | Stop reason: SDUPTHER

## 2018-03-23 RX ORDER — LISINOPRIL 20 MG/1
TABLET ORAL
Qty: 90 TAB | Refills: 1 | Status: SHIPPED | OUTPATIENT
Start: 2018-03-23 | End: 2018-09-09 | Stop reason: SDUPTHER

## 2018-03-23 NOTE — TELEPHONE ENCOUNTER
Refill X 6 months, sent to pharmacy.Pt. Seen in the last 6 months per protocol.   Lab Results   Component Value Date/Time    SODIUM 141 03/03/2018 10:32 AM    POTASSIUM 3.7 03/03/2018 10:32 AM    CHLORIDE 107 03/03/2018 10:32 AM    CO2 26 03/03/2018 10:32 AM    GLUCOSE 117 (H) 03/03/2018 10:32 AM    BUN 14 03/03/2018 10:32 AM    CREATININE 0.87 03/03/2018 10:32 AM

## 2018-03-23 NOTE — TELEPHONE ENCOUNTER
Was the patient seen in the last year in this department? Yes     Does patient have an active prescription for medications requested? No     Received Request Via: Pharmacy      Pt met protocol?: Yes    OV-3/18    BP Readings from Last 1 Encounters:   03/09/18 136/88

## 2018-06-19 ENCOUNTER — SLEEP CENTER VISIT (OUTPATIENT)
Dept: SLEEP MEDICINE | Facility: MEDICAL CENTER | Age: 58
End: 2018-06-19
Payer: COMMERCIAL

## 2018-06-19 VITALS
WEIGHT: 224 LBS | DIASTOLIC BLOOD PRESSURE: 80 MMHG | HEART RATE: 92 BPM | BODY MASS INDEX: 38.24 KG/M2 | RESPIRATION RATE: 15 BRPM | HEIGHT: 64 IN | SYSTOLIC BLOOD PRESSURE: 122 MMHG | OXYGEN SATURATION: 96 %

## 2018-06-19 DIAGNOSIS — G47.33 OSA (OBSTRUCTIVE SLEEP APNEA): ICD-10-CM

## 2018-06-19 PROCEDURE — 99213 OFFICE O/P EST LOW 20 MIN: CPT | Performed by: NURSE PRACTITIONER

## 2018-06-19 NOTE — PROGRESS NOTES
Chief Complaint   Patient presents with   • Follow-Up     Annual    • Apnea       HPI:  Estiven Singer is a 58 y.o. year old male here today for follow-up on his obstructive sleep apnea. He was last seen in the office in June 2017 with ALFA Suarez. Polysomnogram in the past indicated an AHI of 76 with a minimum 02 saturation of 74%. He is on Auto CPAP 8-15 CM H20. Compliance download at his last office visit indicated his AHI had been reduced to 2.5. His compliance download performed today in the office indicates an AHI of 3.7 with an average use of 5-7 hours at night. He states is overdue for supplies and has not used his machine in over a month. Prior to that he had been tolerating the pressure well and waking more refreshed. He prefers the nasal mask. He states now that he has been off therapy he has had trouble sleeping through the night and is tired again during the day. He denies morning headaches. He has had increased dry mouth off therapy as well.     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 1/9/2015       Past Surgical History:   Procedure Laterality Date   • SHOULDER ARTHROSCOPY  5/20/2010    Performed by XIMENA KANG at SURGERY SAME DAY [facility] ORS   • DEBRIDEMENT  5/20/2010    Performed by XIMENA KANG at [facility]   • ROTATOR CUFF REPAIR  5/20/2010    Performed by XIMENA KANG at SURGERY SAME DAY [facility] ORS       Family History   Problem Relation Age of Onset   • Heart Disease Mother    • Cancer Father    • Heart Disease Maternal Aunt    • Heart Disease Maternal Uncle    • Sleep Apnea Neg Hx        Social History     Social History   • Marital status: Single     Spouse name: N/A   • Number of children: N/A   • Years of education: N/A      Occupational History   • Not on file.     Social History Main Topics   • Smoking status: Passive Smoke Exposure - Never Smoker   • Smokeless tobacco: Never Used   • Alcohol use 0.6 oz/week     1 Glasses of wine per week      Comment: seldom   • Drug use: No   • Sexual activity: Not Currently     Other Topics Concern   • Not on file     Social History Narrative   • No narrative on file         ROS:  Constitutional: Denies fevers, chills, sweats, weight loss  Eyes: Denies vision loss, pain, drainage, double vision. Wears glasses, eye patch on right.   Ears/Nose/Mouth/Throat: Denies rhinitis, nasal congestion, ear ache, difficulty hearing, sore throat, persistent hoarseness, decayed teeth/toothache  Cardiovascular: Denies chest pain, tightness, palpitations, swelling in feet/legs, fainting, difficulty breathing when laying down  Respiratory: Denies shortness of breath, cough, sputum, wheezing, painful breathing, coughing up blood  GI: Denies heartburn, difficulty swallowing, nausea, vomiting, abdominal pain, diarrhea, constipation  : Denies frequent urination, painful urination  Integumentary: Denies rashes, lumps or color changes  MSK: Denies painful joints, sore muscles, and back pain.   Neurological: Denies frequent headaches, dizziness, weakness  Sleep: See HPI       Current Outpatient Prescriptions   Medication Sig Dispense Refill   • amLODIPine (NORVASC) 5 MG Tab TAKE 1 TABLET BY MOUTH EVERY DAY 90 Tab 1   • aspirin EC (ECOTRIN) 325 MG Tablet Delayed Response TAKE 1 TABLET BY MOUTH EVERY DAY 90 Tab 1   • lisinopril (PRINIVIL) 20 MG Tab TAKE 1 TABLET BY MOUTH EVERY DAY 90 Tab 1   • atorvastatin (LIPITOR) 40 MG Tab Take 1 Tab by mouth every bedtime. 90 Tab 0   • Blood Glucose Monitoring Suppl Device Meter: Dispense Device of Insurance Preference. Sig. Use as directed for blood sugar monitoring. #1. NR. 1 Device 0   • Blood Glucose Monitoring Suppl Supplies Misc Test strips order: Test strips for patient's  "meter. Sig: use tid and prn ssx high or low sugar #100 RF x 3 100 Strip 3   • Lancets Misc Lancets order: Lancets for  meter. Sig: use fasting and prn ssx high or low sugar. #100 RF x 3 100 Each 3   • PREVIDENT 5000 BOOSTER PLUS 1.1 % Paste   3     No current facility-administered medications for this visit.        No Known Allergies    Blood pressure 122/80, pulse 92, resp. rate 15, height 1.626 m (5' 4\"), weight 101.6 kg (224 lb), SpO2 96 %.    PE:   Appearance: Well developed, well nourished, no acute distress  Eyes: Right eye patch intact, wears glasses  Ears: no lesions or deformities  Hearing: grossly intact  Nose: no lesions or deformities  Oropharynx: tongue normal, posterior pharynx without erythema or exudate  Mallampati Classification: Class 2  Neck: supple, trachea midline, no masses   Respiratory effort: no intercostal retractions or use of accessory muscles  Lung auscultation: no rales, rhonchi or wheezes  Heart auscultation: no murmur rub or gallop  Extremities: no cyanosis or edema  Abdomen: soft ,non tender, no masses  Gait and Station: normal  Digits and nails: no clubbing, cyanosis, petechiae or nodes.  Cranial nerves: grossly intact  Skin: no rashes, lesions or ulcers noted  Orientation: Oriented to time, person and place  Mood and affect: mood and affect appropriate, normal interaction with examiner  Judgement: Intact          Assessment:    1. KEVIN (obstructive sleep apnea)  DME MASK AND SUPPLIES   2. BMI 38.0-38.9,adult  Patient identified as having weight management issue.  Appropriate orders and counseling given.         Plan:    1) Change DME to [facility]. Order for new mask and supplies. Restart Auto CPAP @ 8-15 CM H20.   2) Sleep hygiene discussed. Recommend keeping a set sleep/wake schedule. Logging enough hours of sleep. Limiting/Avoiding naps. No caffeine after noon and no heavy meals in the evening. Recommend daily exercise.   3) Weight loss recommended.  4) Annual follow up with " compliance card download, sooner if needed.

## 2018-06-19 NOTE — PATIENT INSTRUCTIONS
Plan:    1) Change DME to [facility]. Order for new mask and supplies. Restart Auto CPAP @ 8-15 CM H20.   2) Sleep hygiene discussed. Recommend keeping a set sleep/wake schedule. Logging enough hours of sleep. Limiting/Avoiding naps. No caffeine after noon and no heavy meals in the evening. Recommend daily exercise.   3) Weight loss recommended.  4) Annual follow up with compliance card download, sooner if needed.

## 2018-08-09 ENCOUNTER — PATIENT OUTREACH (OUTPATIENT)
Dept: HEALTH INFORMATION MANAGEMENT | Facility: OTHER | Age: 58
End: 2018-08-09

## 2018-08-09 NOTE — PROGRESS NOTES
Outcome: Left Message    Please transfer to Patient Outreach Team at [phone] when patient returns call.    WebIZ Checked & Epic Updated:  yes  CPOX (Varicella)   Influenza w/preserv.   Tdap   Zoster Recombinant     Attempt # 1

## 2018-09-05 ENCOUNTER — OFFICE VISIT (OUTPATIENT)
Dept: MEDICAL GROUP | Facility: PHYSICIAN GROUP | Age: 58
End: 2018-09-05
Payer: COMMERCIAL

## 2018-09-05 VITALS
DIASTOLIC BLOOD PRESSURE: 70 MMHG | BODY MASS INDEX: 39.95 KG/M2 | HEIGHT: 64 IN | SYSTOLIC BLOOD PRESSURE: 126 MMHG | RESPIRATION RATE: 16 BRPM | OXYGEN SATURATION: 97 % | HEART RATE: 80 BPM | TEMPERATURE: 98.3 F | WEIGHT: 234 LBS

## 2018-09-05 DIAGNOSIS — E78.5 HYPERLIPIDEMIA, UNSPECIFIED HYPERLIPIDEMIA TYPE: ICD-10-CM

## 2018-09-05 DIAGNOSIS — E11.9 TYPE 2 DIABETES MELLITUS WITHOUT COMPLICATION, WITHOUT LONG-TERM CURRENT USE OF INSULIN (HCC): ICD-10-CM

## 2018-09-05 DIAGNOSIS — E55.9 VITAMIN D INSUFFICIENCY: ICD-10-CM

## 2018-09-05 DIAGNOSIS — G47.33 OSA (OBSTRUCTIVE SLEEP APNEA): ICD-10-CM

## 2018-09-05 DIAGNOSIS — M25.552 LEFT HIP PAIN: ICD-10-CM

## 2018-09-05 DIAGNOSIS — I10 ESSENTIAL HYPERTENSION: ICD-10-CM

## 2018-09-05 DIAGNOSIS — E55.9 VITAMIN D DEFICIENCY: ICD-10-CM

## 2018-09-05 DIAGNOSIS — G45.9 TRANSIENT CEREBRAL ISCHEMIA, UNSPECIFIED TYPE: ICD-10-CM

## 2018-09-05 LAB
HBA1C MFR BLD: 6.4 % (ref ?–5.8)
INT CON NEG: NEGATIVE
INT CON POS: POSITIVE

## 2018-09-05 PROCEDURE — 83036 HEMOGLOBIN GLYCOSYLATED A1C: CPT | Performed by: NURSE PRACTITIONER

## 2018-09-05 PROCEDURE — 99214 OFFICE O/P EST MOD 30 MIN: CPT | Performed by: NURSE PRACTITIONER

## 2018-09-06 NOTE — PROGRESS NOTES
CC: Diabetes, hypertension, hyperlipidemia    HISTORY OF THE PRESENT ILLNESS: Patient is a 58 y.o. male. This pleasant patient is here today for evaluation and management of hypertension, hyperlipidemia, diabetes.  Patient is new to me.  His previous primary care providers Ana PEACOCK.  Patient is a schoolteacher of third graders.      HTN (hypertension)  This is a chronic health problem that is well controlled with current medications and lifestyle measures.  Patient is taking lisinopril 20 mg and amlodipine 5 mg daily.  Tolerating medication, denies cough or lightheadedness or lower extremity edema. The patient denies chest pain, shortness of breath, epistaxis, headache, vision changes, or dyspnea on exertion.    Hyperlipemia  This is a chronic health problem that is well controlled with current medications and lifestyle measures.  Takes atorvastatin 40 mg daily, though he admits he forgets to take it a couple times a week.  Component      Latest Ref Rng & Units 3/3/2018          10:32 AM   Cholesterol,Tot      100 - 199 mg/dL 132   Triglycerides      0 - 149 mg/dL 94   HDL      >=40 mg/dL 46   LDL      <100 mg/dL 67       Type 2 diabetes mellitus  This is a chronic health problem that is well controlled with current  lifestyle measures.  Patient reports he tries to eat a low-carb diet.  He does not take medication for diabetes.  Point-of-care A1c today is 6.4.  Discussed other lifestyle measures such as routine aerobic exercise and weight loss.  Patient reports he has recently restarted karate after 2 years off, now going 3 days a week.  Patient denies tingling, polyuria, or vision changes.  Patient does not monitor blood sugar at home.  Patient is on ACE and statin.      TIA (transient ischemic attack)  Patient has history of TIA 2 years ago.  He takes aspirin 325 mg daily.  Denies headache, confusion, weakness.    Left hip pain  Patient reports new onset of intermittent left hip pain.  Denies injury.   Aggravating factors are lying in bed on either side.  Sometimes hurt after he gets up from sitting.  Pain is relieved by walking.  This is likely greater trochanteric bursitis.  I have reviewed patient education handout with patient and given him a copy.  Patient will let me know if pain becomes worse or more constant.  Will consider x-ray.      KEVIN (obstructive sleep apnea)  This is a chronic health problem for patient.  He follows with sleep studies for this.  He wears a CPAP.      Allergies: Patient has no known allergies.    Current Outpatient Prescriptions Ordered in [facility]   Medication Sig Dispense Refill   • amLODIPine (NORVASC) 5 MG Tab TAKE 1 TABLET BY MOUTH EVERY DAY 90 Tab 1   • aspirin EC (ECOTRIN) 325 MG Tablet Delayed Response TAKE 1 TABLET BY MOUTH EVERY DAY 90 Tab 1   • lisinopril (PRINIVIL) 20 MG Tab TAKE 1 TABLET BY MOUTH EVERY DAY 90 Tab 1   • atorvastatin (LIPITOR) 40 MG Tab Take 1 Tab by mouth every bedtime. 90 Tab 0   • Blood Glucose Monitoring Suppl Device Meter: Dispense Device of Insurance Preference. Sig. Use as directed for blood sugar monitoring. #1. NR. 1 Device 0   • Blood Glucose Monitoring Suppl Supplies Misc Test strips order: Test strips for patient's meter. Sig: use tid and prn ssx high or low sugar #100 RF x 3 100 Strip 3   • Lancets Misc Lancets order: Lancets for  meter. Sig: use fasting and prn ssx high or low sugar. #100 RF x 3 100 Each 3   • PREVIDENT 5000 BOOSTER PLUS 1.1 % Paste   3     No current [facility]-ordered facility-administered medications on file.        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 1/9/2015       Past Surgical History:   Procedure Laterality Date   • SHOULDER ARTHROSCOPY  5/20/2010    Performed by XIMENA KANG at SURGERY SAME DAY  "[facility] ORS   • DEBRIDEMENT  5/20/2010    Performed by XIMENA KANG at SURGERY SAME DAY [facility]   • ROTATOR CUFF REPAIR  5/20/2010    Performed by XIMENA KANG at SURGERY SAME DAY [facility]       Social History   Substance Use Topics   • Smoking status: Passive Smoke Exposure - Never Smoker   • Smokeless tobacco: Never Used   • Alcohol use 0.6 oz/week     1 Glasses of wine per week      Comment: seldom       Family History   Problem Relation Age of Onset   • Heart Disease Mother    • Cancer Father    • Heart Disease Maternal Aunt    • Heart Disease Maternal Uncle    • Stroke Brother    • Sleep Apnea Neg Hx        ROS:     - Constitutional: Negative for fever, chills, unexpected weight change, and fatigue/generalized weakness.     - HEENT: Negative for headaches, vision changes, hearing changes, ear pain, rhinorrhea, sinus congestion, and sore throat. Positive cataract surgery 2 months ago, bilateral.    - Respiratory: Negative for cough, dyspnea, and wheezing.      - Cardiovascular: Negative for chest pain, palpitations, orthopnea, and bilateral lower extremity edema.     - Gastrointestinal: Negative for heartburn, nausea, vomiting, abdominal pain, hematochezia, melena, diarrhea, constipation.     - Genitourinary: Negative for dysuria, polyuria, hematuria, pyuria, urinary urgency, and urinary incontinence.    - Musculoskeletal: As in HPI.     - Skin: Negative for rash, itching, cyanotic skin color change.     - Neurological: Negative for dizziness, tingling, tremors, focal sensory deficit, focal weakness and headaches.     - Psychiatric/Behavioral: Negative for depression.           Exam: Blood pressure 126/70, pulse 80, temperature 36.8 °C (98.3 °F), resp. rate 16, height 1.626 m (5' 4\"), weight 106.1 kg (234 lb), SpO2 97 %. Body mass index is 40.17 kg/m².    General: Alert, pleasant, obese habitus, well nourished, well developed male in NAD  HEENT: Normocephalic. Eyes conjunctiva clear lids " without ptosis, pupils equal and reactive to light, ears normal shape and contour, canals are clear bilaterally, tympanic membranes are pearly gray with good light reflex, nasal mucosa without erythema and drainage, oropharynx is without erythema, edema or exudates.   Neck: Supple without bruit. Thyroid is not enlarged.  Pulmonary: Clear to ausculation.  Normal effort. No rales, ronchi, or wheezing.  Cardiovascular: Normal rate and rhythm without murmur. Carotid and radial pulses are intact and equal bilaterally.  No lower extremity edema.  Abdomen: Soft, nontender, nondistended. Normal bowel sounds. Liver and spleen are not palpable  Neurologic: Grossly nonfocal  Lymph: No cervical or supraclavicular lymph nodes are palpable  Skin: Warm and dry.  No obvious lesions.  Musculoskeletal: Normal gait.   Psych: Normal mood and affect. Alert and oriented. Judgment and insight is normal.    Please note that this dictation was created using voice recognition software. I have made every reasonable attempt to correct obvious errors, but I expect that there are errors of grammar and possibly content that I did not discover before finalizing the note.      Assessment/Plan  1. Essential hypertension  Continue with medications and lifestyle measures.      2. Hyperlipidemia, unspecified hyperlipidemia type  Continue with medications and lifestyle measures.  - LIPID PROFILE; Future    3. Type 2 diabetes mellitus without complication, without long-term current use of insulin (HCC)  Continue with lifestyle measures.  Will review lab results at patient's next appointment.  - POCT  A1C  - COMP METABOLIC PANEL; Future  - HEMOGLOBIN A1C; Future  - TSH; Future  - FREE THYROXINE; Future  - MICROALBUMIN CREAT RATIO URINE; Future  - LIPID PROFILE; Future    4. Vitamin D deficiency  - VITAMIN D,25 HYDROXY; Future    5. Transient cerebral ischemia, unspecified type  Continue with aspirin and lifestyle measures.    7. Left hip pain  Continue  with lifestyle measures.    8. KEVIN (obstructive sleep apnea)  Continue follow-up with sleep studies.    Patient will return to clinic in 6 months for diabetes, hypertension, hyperlipidemia and to review labs.  Patient will return to clinic sooner if needed.

## 2018-09-06 NOTE — ASSESSMENT & PLAN NOTE
This is a chronic health problem that is well controlled with current  lifestyle measures.  Patient reports he tries to eat a low-carb diet.  He does not take medication for diabetes.  Point-of-care A1c today is 6.4.  Discussed other lifestyle measures such as routine aerobic exercise and weight loss.  Patient reports he has recently restarted karate after 2 years off, now going 3 days a week.  Patient denies tingling, polyuria, or vision changes.  Patient does not monitor blood sugar at home.  Patient is on ACE and statin.

## 2018-09-06 NOTE — PATIENT INSTRUCTIONS
Trochanteric Bursitis  Trochanteric bursitis is a condition that causes hip pain. Trochanteric bursitis happens when fluid-filled sacs (bursae) in the hip get irritated. Normally these sacs absorb shock and help strong bands of tissue (tendons) in your hip glide smoothly over each other and over your hip bones.  What are the causes?  This condition results from increased friction between the hip bones and the tendons that go over them. This condition can happen if you:  · Have weak hips.  · Use your hip muscles too much (overuse).  · Get hit in the hip.  What increases the risk?  This condition is more likely to develop in:  · Women.  · Adults who are middle-aged or older.  · People with arthritis or a spinal condition.  · People with weak buttocks muscles (gluteal muscles).  · People who have one leg that is shorter than the other.  · People who participate in certain kinds of athletic activities, such as:  ¨ Running sports, especially long-distance running.  ¨ Contact sports, like football or martial arts.  ¨ Sports in which falls may occur, like skiing.  What are the signs or symptoms?  The main symptom of this condition is pain and tenderness over the point of your hip. The pain may be:  · Sharp and intense.  · Dull and achy.  · Felt on the outside of your thigh.  It may increase when you:  · Lie on your side.  · Walk or run.  · Go up on stairs.  · Sit.  · Stand up after sitting.  · Stand for long periods of time.  How is this diagnosed?  This condition may be diagnosed based on:  · Your symptoms.  · Your medical history.  · A physical exam.  · Imaging tests, such as:  ¨ X-rays to check your bones.  ¨ An MRI or ultrasound to check your tendons and muscles.  During your physical exam, your health care provider will check the movement and strength of your hip. He or she may press on the point of your hip to check for pain.  How is this treated?  This condition may be treated by:  · Resting.  · Reducing your  activity.  · Avoiding activities that cause pain.  · Using crutches, a cane, or a walker to decrease the strain on your hip.  · Taking medicine to help with swelling.  · Having medicine injected into the bursae to help with swelling.  · Using ice, heat, and massage therapy for pain relief.  · Physical therapy exercises for strength and flexibility.  · Surgery (rare).  Follow these instructions at home:  Activity  · Rest.  · Avoid activities that cause pain.  · Return to your normal activities as told by your health care provider. Ask your health care provider what activities are safe for you.  Managing pain, stiffness, and swelling  · Take over-the-counter and prescription medicines only as told by your health care provider.  · If directed, apply heat to the injured area as told by your health care provider.  ¨ Place a towel between your skin and the heat source.  ¨ Leave the heat on for 20-30 minutes.  ¨ Remove the heat if your skin turns bright red. This is especially important if you are unable to feel pain, heat, or cold. You may have a greater risk of getting burned.  · If directed, apply ice to the injured area:  ¨ Put ice in a plastic bag.  ¨ Place a towel between your skin and the bag.  ¨ Leave the ice on for 20 minutes, 2-3 times a day.  General instructions  · If the affected leg is one that you use for driving, ask your health care provider when it is safe to drive.  · Use crutches, a cane, or a walker as told by your health care provider.  · If one of your legs is shorter than the other, get fitted for a shoe insert.  · Lose weight if you are overweight.  How is this prevented?  · Wear supportive footwear that is appropriate for your sport.  · If you have hip pain, start any new exercise or sport slowly.  · Maintain physical fitness, including:  ¨ Strength.  ¨ Flexibility.  Contact a health care provider if:  · Your pain does not improve with 2-4 weeks.  Get help right away if:  · You develop severe  pain.  · You have a fever.  · You develop increased redness over your hip.  · You have a change in your bowel function or bladder function.  · You cannot control the muscles in your feet.  This information is not intended to replace advice given to you by your health care provider. Make sure you discuss any questions you have with your health care provider.  Document Released: 01/25/2006 Document Revised: 08/23/2017 Document Reviewed: 12/02/2016  [employer] Interactive Patient Education © 2017 [employer]

## 2018-09-06 NOTE — ASSESSMENT & PLAN NOTE
This is a chronic health problem that is well controlled with current medications and lifestyle measures.  Takes atorvastatin 40 mg daily, though he admits he forgets to take it a couple times a week.  Component      Latest Ref Rng & Units 3/3/2018          10:32 AM   Cholesterol,Tot      100 - 199 mg/dL 132   Triglycerides      0 - 149 mg/dL 94   HDL      >=40 mg/dL 46   LDL      <100 mg/dL 67

## 2018-09-06 NOTE — ASSESSMENT & PLAN NOTE
This is a chronic health problem that is well controlled with current medications and lifestyle measures.  Patient is taking lisinopril 20 mg and amlodipine 5 mg daily.  Tolerating medication, denies cough or lightheadedness or lower extremity edema. The patient denies chest pain, shortness of breath, epistaxis, headache, vision changes, or dyspnea on exertion.

## 2018-09-06 NOTE — ASSESSMENT & PLAN NOTE
This is a chronic health problem for patient.  He follows with sleep studies for this.  He wears a CPAP.

## 2018-09-06 NOTE — ASSESSMENT & PLAN NOTE
Patient reports new onset of intermittent left hip pain.  Denies injury.  Aggravating factors are lying in bed on either side.  Sometimes hurt after he gets up from sitting.  Pain is relieved by walking.  This is likely greater trochanteric bursitis.  I have reviewed patient education handout with patient and given him a copy.  Patient will let me know if pain becomes worse or more constant.  Will consider x-ray.

## 2018-09-06 NOTE — ASSESSMENT & PLAN NOTE
Patient has history of TIA 2 years ago.  He takes aspirin 325 mg daily.  Denies headache, confusion, weakness.

## 2018-09-11 RX ORDER — LISINOPRIL 20 MG/1
TABLET ORAL
Qty: 90 TAB | Refills: 1 | Status: SHIPPED | OUTPATIENT
Start: 2018-09-11 | End: 2019-05-03 | Stop reason: SDUPTHER

## 2018-09-11 RX ORDER — AMLODIPINE BESYLATE 5 MG/1
TABLET ORAL
Qty: 90 TAB | Refills: 1 | Status: SHIPPED | OUTPATIENT
Start: 2018-09-11 | End: 2019-05-08 | Stop reason: SDUPTHER

## 2018-09-11 NOTE — TELEPHONE ENCOUNTER
Was the patient seen in the last year in this department? Yes    Does patient have an active prescription for medications requested? No     Received Request Via: Pharmacy    Pt met protocol?: Yes     Last OV 09/2018  BP Readings from Last 1 Encounters:   09/05/18 126/70

## 2019-05-09 RX ORDER — AMLODIPINE BESYLATE 5 MG/1
TABLET ORAL
Qty: 90 TAB | Refills: 0 | Status: SHIPPED | OUTPATIENT
Start: 2019-05-09 | End: 2019-08-06 | Stop reason: SDUPTHER

## 2019-05-09 NOTE — TELEPHONE ENCOUNTER
Was the patient seen in the last year in this department? Yes    Does patient have an active prescription for medications requested? No     Received Request Via: Pharmacy    Pt met protocol?: No     Last OV 09/05/2018    BP Readings from Last 1 Encounters:   09/05/18 126/70

## 2019-07-01 ENCOUNTER — OFFICE VISIT (OUTPATIENT)
Dept: URGENT CARE | Facility: PHYSICIAN GROUP | Age: 59
End: 2019-07-01
Payer: COMMERCIAL

## 2019-07-01 VITALS — BODY MASS INDEX: 39.31 KG/M2 | RESPIRATION RATE: 16 BRPM | WEIGHT: 229 LBS

## 2019-07-01 DIAGNOSIS — A09 TRAVELER'S DIARRHEA: ICD-10-CM

## 2019-07-01 PROCEDURE — 99214 OFFICE O/P EST MOD 30 MIN: CPT | Performed by: PHYSICIAN ASSISTANT

## 2019-07-01 RX ORDER — AZITHROMYCIN 500 MG/1
500 TABLET, FILM COATED ORAL DAILY
Qty: 3 TAB | Refills: 0 | Status: SHIPPED | OUTPATIENT
Start: 2019-07-01 | End: 2019-07-04

## 2019-07-01 ASSESSMENT — ENCOUNTER SYMPTOMS
ABDOMINAL PAIN: 0
WEAKNESS: 0
VOMITING: 0
WEIGHT LOSS: 0
CHILLS: 0
DIAPHORESIS: 0
COUGH: 0
DIZZINESS: 0
HEADACHES: 0
SHORTNESS OF BREATH: 0
NAUSEA: 1
BLOATING: 1
FEVER: 1
DIARRHEA: 1
PALPITATIONS: 0

## 2019-07-01 NOTE — PROGRESS NOTES
Subjective:      Estiven Singer is a 59 y.o. male who presents with No chief complaint on file.            Diarrhea    This is a new problem. The current episode started in the past 7 days. The problem occurs 5 to 10 times per day. The problem has been unchanged. The stool consistency is described as watery. Associated symptoms include bloating and a fever. Pertinent negatives include no abdominal pain, chills, coughing, headaches, vomiting or weight loss. Nothing aggravates the symptoms. Risk factors include travel to endemic area and suspect food intake. He has tried anti-motility drug for the symptoms. The treatment provided no relief.       Review of Systems   Constitutional: Positive for fever and malaise/fatigue. Negative for chills, diaphoresis and weight loss.   Respiratory: Negative for cough and shortness of breath.    Cardiovascular: Negative for chest pain and palpitations.   Gastrointestinal: Positive for bloating, diarrhea and nausea. Negative for abdominal pain and vomiting.   Skin: Negative for itching and rash.   Neurological: Negative for dizziness, weakness and headaches.   All other systems reviewed and are negative.    PMH:  has a past medical history of Hyperlipemia (4/29/2015); Hypertension; KEVIN (obstructive sleep apnea) (1/8/2015); Pain; Pneumonia; Snoring; Type 2 diabetes mellitus (HCC) (1/8/2015); and Vertigo of central origin of both ears (1/9/2015).  MEDS:   Current Outpatient Prescriptions:   •  azithromycin (ZITHROMAX) 500 MG tablet, Take 1 Tab by mouth every day for 3 days., Disp: 3 Tab, Rfl: 0  •  amLODIPine (NORVASC) 5 MG Tab, TAKE 1 TABLET BY MOUTH EVERY DAY, Disp: 90 Tab, Rfl: 0  •  lisinopril (PRINIVIL) 20 MG Tab, TAKE 1 TABLET BY MOUTH EVERY DAY, Disp: 90 Tab, Rfl: 0  •  aspirin EC (ECOTRIN) 325 MG Tablet Delayed Response, TAKE 1 TABLET BY MOUTH EVERY DAY, Disp: 90 Tab, Rfl: 1  •  atorvastatin (LIPITOR) 40 MG Tab, Take 1 Tab by mouth every bedtime., Disp: 90 Tab, Rfl: 0  •  Blood  Glucose Monitoring Suppl Device, Meter: Dispense Device of Insurance Preference. Sig. Use as directed for blood sugar monitoring. #1. NR., Disp: 1 Device, Rfl: 0  •  Blood Glucose Monitoring Suppl Supplies Misc, Test strips order: Test strips for patient's meter. Sig: use tid and prn ssx high or low sugar #100 RF x 3, Disp: 100 Strip, Rfl: 3  •  Lancets Misc, Lancets order: Lancets for  meter. Sig: use fasting and prn ssx high or low sugar. #100 RF x 3, Disp: 100 Each, Rfl: 3  •  PREVIDENT 5000 BOOSTER PLUS 1.1 % Paste, , Disp: , Rfl: 3  ALLERGIES: No Known Allergies  SURGHX:   Past Surgical History:   Procedure Laterality Date   • CATARACT EXTRACTION WITH IOL Bilateral 2018   • SHOULDER ARTHROSCOPY  5/20/2010    Performed by XIMENA KANG at SURGERY SAME DAY [facility] ORS   • DEBRIDEMENT  5/20/2010    Performed by XIMENA KANG at SURGERY SAME DAY [facility] ORS   • ROTATOR CUFF REPAIR  5/20/2010    Performed by XIMENA KANG at SURGERY SAME DAY [facility] ORS     SOCHX:  reports that he is a non-smoker but has been exposed to tobacco smoke. He has never used smokeless tobacco. He reports that he drinks about 0.6 oz of alcohol per week . He reports that he does not use drugs.  FH: Family history was reviewed, no pertinent findings to report  Medications, Allergies, and current problem list reviewed today in Epic       Objective:     Resp 16   Wt 103.9 kg (229 lb)   BMI 39.31 kg/m²      Physical Exam   Constitutional: He is oriented to person, place, and time. He appears well-developed and well-nourished. He is active.  Non-toxic appearance. He does not have a sickly appearance. He does not appear ill. No distress.   HENT:   Head: Normocephalic and atraumatic.   Right Ear: External ear normal.   Left Ear: External ear normal.   Nose: Nose normal.   Mouth/Throat: Oropharynx is clear and moist.   Eyes: Conjunctivae, EOM and lids are normal.   Neck: Normal range of motion and full passive range of motion  without pain. Neck supple.   Cardiovascular: Normal rate, regular rhythm, S1 normal, S2 normal and normal heart sounds.  Exam reveals no gallop and no friction rub.    No murmur heard.  Pulmonary/Chest: Effort normal and breath sounds normal. No respiratory distress. He has no decreased breath sounds. He has no wheezes. He has no rales. He exhibits no tenderness.   Abdominal: Soft. Normal appearance and bowel sounds are normal. He exhibits no shifting dullness, no distension, no pulsatile liver, no fluid wave, no abdominal bruit, no ascites, no pulsatile midline mass and no mass. There is no tenderness. There is no rigidity, no rebound, no guarding, no CVA tenderness, no tenderness at McBurney's point and negative Oneill's sign. No hernia.   Abdomen: No PTP of abdomen .  Soft and nondistended. Normal bowel sounds. No hepatosplenomegaly or masses, or hernias. No rebound or guarding.     Musculoskeletal: Normal range of motion. He exhibits no edema, tenderness or deformity.   Neurological: He is alert and oriented to person, place, and time.   Skin: Skin is warm, dry and intact. He is not diaphoretic.   Psychiatric: He has a normal mood and affect. His speech is normal and behavior is normal. Judgment and thought content normal. Cognition and memory are normal.   Vitals reviewed.              Assessment/Plan:     1. Traveler's diarrhea  - recently returned from [place]  - azithromycin (ZITHROMAX) 500 MG tablet; Take 1 Tab by mouth every day for 3 days.  Dispense: 3 Tab; Refill: 0  - CULTURE STOOL; Future    Differential diagnosis, natural history, supportive care discussed. Follow-up with primary care provider within 7-10 days, emergency room precautions discussed.  Patient and/or family appears understanding of information.  Handout and review of patients diagnosis and treatment was discussed extensively.

## 2019-08-07 RX ORDER — AMLODIPINE BESYLATE 5 MG/1
TABLET ORAL
Qty: 90 TAB | Refills: 0 | Status: SHIPPED | OUTPATIENT
Start: 2019-08-07 | End: 2019-11-07 | Stop reason: SDUPTHER

## 2019-08-07 RX ORDER — LISINOPRIL 20 MG/1
TABLET ORAL
Qty: 90 TAB | Refills: 0 | Status: SHIPPED | OUTPATIENT
Start: 2019-08-07 | End: 2019-11-07 | Stop reason: SDUPTHER

## 2019-08-07 NOTE — TELEPHONE ENCOUNTER
Was the patient seen in the last year in this department? Yes    Does patient have an active prescription for medications requested? No     Received Request Via: Pharmacy      Pt met protocol?: No   Pt last ov 9/2018   BP Readings from Last 1 Encounters:   09/05/18 126/70

## 2020-02-05 RX ORDER — AMLODIPINE BESYLATE 5 MG/1
5 TABLET ORAL
Qty: 90 TAB | Refills: 0 | Status: SHIPPED | OUTPATIENT
Start: 2020-02-05 | End: 2020-04-21 | Stop reason: SDUPTHER

## 2020-02-05 NOTE — TELEPHONE ENCOUNTER
Received request via: Pharmacy    Was the patient seen in the last year in this department? No    Does the patient have an active prescription (recently filled or refills available) for medication(s) requested? No     Last OV 9/5/18, last labs 3/3/18, no OV scheduled

## 2020-02-06 NOTE — TELEPHONE ENCOUNTER
Refill for 90-day supply sent to pharmacy.  Please inform patient that he needs lab work and appointment before further refills.  Lab orders have been sent to lab.  He will need to be fasting.  Let him know that I am in [place] now.  Please do not send My Chart message.  Patient did not read the last one from 11/2019.

## 2020-02-19 RX ORDER — LISINOPRIL 20 MG/1
20 TABLET ORAL
Qty: 90 TAB | Refills: 0 | Status: SHIPPED | OUTPATIENT
Start: 2020-02-19 | End: 2020-04-21 | Stop reason: SDUPTHER

## 2020-02-19 NOTE — TELEPHONE ENCOUNTER
Requested Prescriptions     Pending Prescriptions Disp Refills   • lisinopril (PRINIVIL) 20 MG Tab 90 Tab 1     Sig: Take 1 Tab by mouth every day.       Last OV: 09/05/2018  Last lab: 11/17/2016

## 2020-02-20 NOTE — TELEPHONE ENCOUNTER
Refill for 90-day supply sent to pharmacy.  Please inform patient that he needs lab work and appointment before further refills.  Lab orders have been sent to lab.  He will need to be fasting.  Let him know that I am in [place] now.  Please do not send My Chart message.  Patient did not read the last one from 11/2019.

## 2020-04-13 ENCOUNTER — TELEPHONE (OUTPATIENT)
Dept: HEALTH INFORMATION MANAGEMENT | Facility: OTHER | Age: 60
End: 2020-04-13

## 2020-04-13 NOTE — TELEPHONE ENCOUNTER
1. Caller Name: Estiven Singer                        Call Back Number: [phone]  [facility] PCP or Specialty Provider: Yes Idalia Cuello        2.  Does patient have any active symptoms of respiratory illness (fever OR cough OR shortness of breath OR sore throat)? Yes, the patient reports the following respiratory symptoms: cough.    3.  Does patient have any comoribidities? DM, HTN    4.  Has the patient traveled in the last 14 days OR had any known contact with someone who is suspected or confirmed to have COVID-19?  No.    5. Disposition: Cleared by RN Triage; OK to keep/schedule appointment       Pt reports cough that comes and goes dependent on the weather.  Cough was present for a long time.     Pain is left of the navel.  Patient has been remodeling his bathroom and feels he may have a hernia.    He also needs labs and a PCP appt for  medication refill     Note routed to [facility] Provider: FYI only.

## 2020-04-14 ENCOUNTER — TELEPHONE (OUTPATIENT)
Dept: MEDICAL GROUP | Facility: PHYSICIAN GROUP | Age: 60
End: 2020-04-14

## 2020-04-14 NOTE — TELEPHONE ENCOUNTER
Received VM from pt stating that he would like his labs to be faxed over to [facility] in [place] to get them drawn at the lab lory inside. Sent labs to [facility]: , advised pt that they were sent, and confirmed his appt 04/21/2020 @9:40am

## 2020-04-16 LAB
25(OH)D3+25(OH)D2 SERPL-MCNC: 22.6 NG/ML (ref 30–100)
ALBUMIN SERPL-MCNC: 4.5 G/DL (ref 3.8–4.9)
ALBUMIN/GLOB SERPL: 1.7 {RATIO} (ref 1.2–2.2)
ALP SERPL-CCNC: 67 IU/L (ref 39–117)
ALT SERPL-CCNC: 17 IU/L (ref 0–44)
AMBIG ABBREV CMP14 DFLT   977206: NORMAL
AMBIG ABBREV LP  977212: NORMAL
AST SERPL-CCNC: 17 IU/L (ref 0–40)
BILIRUB SERPL-MCNC: 0.3 MG/DL (ref 0–1.2)
BUN SERPL-MCNC: 14 MG/DL (ref 6–24)
BUN/CREAT SERPL: 16 (ref 9–20)
CALCIUM SERPL-MCNC: 9.6 MG/DL (ref 8.7–10.2)
CHLORIDE SERPL-SCNC: 103 MMOL/L (ref 96–106)
CHOLEST SERPL-MCNC: 221 MG/DL (ref 100–199)
CO2 SERPL-SCNC: 24 MMOL/L (ref 20–29)
CREAT SERPL-MCNC: 0.85 MG/DL (ref 0.76–1.27)
GLOBULIN SER CALC-MCNC: 2.7 G/DL (ref 1.5–4.5)
GLUCOSE SERPL-MCNC: 120 MG/DL (ref 65–99)
HDLC SERPL-MCNC: 43 MG/DL
LABORATORY COMMENT REPORT: ABNORMAL
LDLC SERPL CALC-MCNC: 162 MG/DL (ref 0–99)
POTASSIUM SERPL-SCNC: 4.3 MMOL/L (ref 3.5–5.2)
PROT SERPL-MCNC: 7.2 G/DL (ref 6–8.5)
SODIUM SERPL-SCNC: 143 MMOL/L (ref 134–144)
T4 FREE SERPL-MCNC: 1.17 NG/DL (ref 0.82–1.77)
TRIGL SERPL-MCNC: 78 MG/DL (ref 0–149)
TSH SERPL DL<=0.005 MIU/L-ACNC: 2.08 UIU/ML (ref 0.45–4.5)
VLDLC SERPL CALC-MCNC: 16 MG/DL (ref 5–40)

## 2020-04-21 ENCOUNTER — OFFICE VISIT (OUTPATIENT)
Dept: MEDICAL GROUP | Facility: PHYSICIAN GROUP | Age: 60
End: 2020-04-21
Payer: COMMERCIAL

## 2020-04-21 VITALS
TEMPERATURE: 97.2 F | OXYGEN SATURATION: 95 % | HEART RATE: 75 BPM | HEIGHT: 64 IN | SYSTOLIC BLOOD PRESSURE: 140 MMHG | WEIGHT: 219 LBS | DIASTOLIC BLOOD PRESSURE: 82 MMHG | BODY MASS INDEX: 37.39 KG/M2

## 2020-04-21 DIAGNOSIS — R10.32 LEFT LOWER QUADRANT ABDOMINAL PAIN: ICD-10-CM

## 2020-04-21 DIAGNOSIS — G45.9 TIA (TRANSIENT ISCHEMIC ATTACK): ICD-10-CM

## 2020-04-21 DIAGNOSIS — I10 ESSENTIAL HYPERTENSION: ICD-10-CM

## 2020-04-21 DIAGNOSIS — E78.5 HYPERLIPIDEMIA, UNSPECIFIED HYPERLIPIDEMIA TYPE: ICD-10-CM

## 2020-04-21 DIAGNOSIS — E66.9 OBESITY (BMI 30-39.9): ICD-10-CM

## 2020-04-21 DIAGNOSIS — M25.512 CHRONIC LEFT SHOULDER PAIN: ICD-10-CM

## 2020-04-21 DIAGNOSIS — G89.29 CHRONIC LEFT SHOULDER PAIN: ICD-10-CM

## 2020-04-21 DIAGNOSIS — E11.9 TYPE 2 DIABETES MELLITUS WITHOUT COMPLICATION, WITHOUT LONG-TERM CURRENT USE OF INSULIN (HCC): ICD-10-CM

## 2020-04-21 PROBLEM — L98.9 SKIN LESION: Status: RESOLVED | Noted: 2018-03-09 | Resolved: 2020-04-21

## 2020-04-21 LAB
HBA1C MFR BLD: 5.8 % (ref 0–5.6)
INT CON NEG: ABNORMAL
INT CON POS: ABNORMAL

## 2020-04-21 PROCEDURE — 83036 HEMOGLOBIN GLYCOSYLATED A1C: CPT | Performed by: NURSE PRACTITIONER

## 2020-04-21 PROCEDURE — 99214 OFFICE O/P EST MOD 30 MIN: CPT | Performed by: NURSE PRACTITIONER

## 2020-04-21 RX ORDER — ATORVASTATIN CALCIUM 40 MG/1
40 TABLET, FILM COATED ORAL
Qty: 90 TAB | Refills: 1 | Status: SHIPPED | OUTPATIENT
Start: 2020-04-21 | End: 2021-02-04 | Stop reason: SDUPTHER

## 2020-04-21 RX ORDER — LISINOPRIL 20 MG/1
20 TABLET ORAL
Qty: 90 TAB | Refills: 1 | Status: SHIPPED | OUTPATIENT
Start: 2020-04-21 | End: 2021-02-04 | Stop reason: SDUPTHER

## 2020-04-21 RX ORDER — AMLODIPINE BESYLATE 5 MG/1
5 TABLET ORAL
Qty: 90 TAB | Refills: 1 | Status: SHIPPED | OUTPATIENT
Start: 2020-04-21 | End: 2021-02-04 | Stop reason: SDUPTHER

## 2020-04-21 ASSESSMENT — PATIENT HEALTH QUESTIONNAIRE - PHQ9: CLINICAL INTERPRETATION OF PHQ2 SCORE: 0

## 2020-04-21 NOTE — ASSESSMENT & PLAN NOTE
Patient reports left shoulder pain, anterior aspect.  Onset over 3 months ago.  Denies injury.  Quality of pain is sharp, occasionally radiates to elbow.  Reports dislocated elbow in third grade.  Shoulder pain aggravated by extension.  Denies neck pain, weakness, numbness and tingling.  Pain has been getting worse since onset.  Aspirin helps somewhat.  Has not used heat or ice.

## 2020-04-21 NOTE — PATIENT INSTRUCTIONS
Shoulder Pain  Many things can cause shoulder pain, including:  · An injury.  · Moving the arm in the same way again and again (overuse).  · Joint pain (arthritis).  Follow these instructions at home:  Take these actions to help with your pain:  · Squeeze a soft ball or a foam pad as much as you can. This helps to prevent swelling. It also makes the arm stronger.  · Take over-the-counter and prescription medicines only as told by your doctor.  · If told, put ice on the area:  ¨ Put ice in a plastic bag.  ¨ Place a towel between your skin and the bag.  ¨ Leave the ice on for 20 minutes, 2-3 times per day. Stop putting on ice if it does not help with the pain.  · If you were given a shoulder sling or immobilizer:  ¨ Wear it as told.  ¨ Remove it to shower or bathe.  ¨ Move your arm as little as possible.  ¨ Keep your hand moving. This helps prevent swelling.  Contact a doctor if:  · Your pain gets worse.  · Medicine does not help your pain.  · You have new pain in your arm, hand, or fingers.  Get help right away if:  · Your arm, hand, or fingers:  ¨ Tingle.  ¨ Are numb.  ¨ Are swollen.  ¨ Are painful.  ¨ Turn white or blue.  This information is not intended to replace advice given to you by your health care provider. Make sure you discuss any questions you have with your health care provider.  Document Released: 06/05/2009 Document Revised: 08/13/2017 Document Reviewed: 04/11/2016  [employer] Interactive Patient Education © 2017 [employer]

## 2020-04-21 NOTE — ASSESSMENT & PLAN NOTE
History of TIA almost 4 years ago.  Taking daily aspirin.  Stopped taking atorvastatin over a year ago.  Denies headache, confusion, weakness.

## 2020-04-21 NOTE — ASSESSMENT & PLAN NOTE
Patient reports new onset of left lower quadrant pain that occurs with abdominal flexion.  Started last week suddenly when getting up from the couch.  Denies feeling of protrusion.  Denies other abdominal pain, diarrhea, constipation, blood in stool, fever.

## 2020-04-21 NOTE — PROGRESS NOTES
"CC: Lab review, chronic health problems, shoulder pain, possible hernia    HISTORY OF THE PRESENT ILLNESS: Patient is a 59 y.o. male. This pleasant patient is here today for evaluation and management of the following health problems.    Patient has not been seen in clinic in over a year.      HTN (hypertension)  This is a chronic health problem that is well controlled with current medications and lifestyle measures.  Taking amlodipine 5 mg daily and lisinopril 20 mg daily.  Reports home blood pressure readings range from 130s/70s to 85.  Has intentionally lost 12 pounds since the new year.  Has been doing karate for exercise.  Unfortunately, I had to stop karate due to shoulder pain.  Please see additional notes.  The patient denies chest pain, shortness of breath, headache, vision changes, epistaxis, or dyspnea on exertion.        Type 2 diabetes mellitus  This is a chronic health problem that is well controlled with current lifestyle measures.  Patient has been working on weight loss.  Has lost 12 pounds since the new year.  Has been working on diabetic diet.  A1c is improved to 5.8% today.            Hyperlipemia  This is a chronic health problem that is uncontrolled with current medications and lifestyle measures.  Has not been taking his atorvastatin for over a year.  Reports \"I just kept forgetting to take it.\"  Denies side effects while he was taking it over a year ago.  History of TIA.  Total cholesterol is 221, triglycerides 78, HDL 43, .  The 10-year ASCVD risk score (Lawrence DC Jr., et al., 2013) is: 20.8%          TIA (transient ischemic attack)  History of TIA almost 4 years ago.  Taking daily aspirin.  Stopped taking atorvastatin over a year ago.  Denies headache, confusion, weakness.    Chronic left shoulder pain  Patient reports left shoulder pain, anterior aspect.  Onset over 3 months ago.  Denies injury.  Quality of pain is sharp, occasionally radiates to elbow.  Reports dislocated elbow in " third grade.  Shoulder pain aggravated by extension.  Denies neck pain, weakness, numbness and tingling.  Pain has been getting worse since onset.  Aspirin helps somewhat.  Has not used heat or ice.    Left lower quadrant abdominal pain  Patient reports new onset of left lower quadrant pain that occurs with abdominal flexion.  Started last week suddenly when getting up from the couch.  Denies feeling of protrusion.  Denies other abdominal pain, diarrhea, constipation, blood in stool, fever.      Allergies: Patient has no known allergies.    Current Outpatient Medications Ordered in Epic   Medication Sig Dispense Refill   • atorvastatin (LIPITOR) 40 MG Tab Take 1 Tab by mouth every bedtime. 90 Tab 1   • lisinopril (PRINIVIL) 20 MG Tab Take 1 Tab by mouth every day. 90 Tab 1   • amLODIPine (NORVASC) 5 MG Tab Take 1 Tab by mouth every day. 90 Tab 1   • aspirin EC (ECOTRIN) 325 MG Tablet Delayed Response TAKE 1 TABLET BY MOUTH EVERY DAY 90 Tab 1   • Blood Glucose Monitoring Suppl Device Meter: Dispense Device of Insurance Preference. Sig. Use as directed for blood sugar monitoring. #1. NR. 1 Device 0   • Blood Glucose Monitoring Suppl Supplies Misc Test strips order: Test strips for patient's meter. Sig: use tid and prn ssx high or low sugar #100 RF x 3 100 Strip 3   • Lancets Misc Lancets order: Lancets for  meter. Sig: use fasting and prn ssx high or low sugar. #100 RF x 3 100 Each 3   • PREVIDENT 5000 BOOSTER PLUS 1.1 % Paste   3     No current [facility]-ordered facility-administered medications on file.        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 1/9/2015       Past Surgical History:   Procedure Laterality Date   • CATARACT EXTRACTION WITH IOL Bilateral 2018   • SHOULDER ARTHROSCOPY  5/20/2010     "Performed by XIMENA KANG at SURGERY SAME DAY [facility] ORS   • DEBRIDEMENT  5/20/2010    Performed by XIMENA KANG at SURGERY SAME DAY [facility] ORS   • ROTATOR CUFF REPAIR  5/20/2010    Performed by XIMENA KANG at SURGERY [facility] DAY [facility]       Social History     Tobacco Use   • Smoking status: Passive Smoke Exposure - Never Smoker   • Smokeless tobacco: Never Used   Substance Use Topics   • Alcohol use: Yes     Alcohol/week: 0.6 oz     Types: 1 Glasses of wine per week     Comment: seldom   • Drug use: No       Family History   Problem Relation Age of Onset   • Heart Disease Mother    • Cancer Father    • Heart Disease Maternal Aunt    • Heart Disease Maternal Uncle    • Stroke Brother    • Sleep Apnea Neg Hx        ROS:   As in HPI, otherwise negative for chest pain, dyspnea, abdominal pain, dysuria, blood in stool, fever          Exam: /82   Pulse 75   Temp 36.2 °C (97.2 °F) (Temporal)   Ht 1.626 m (5' 4\")   Wt 99.3 kg (219 lb)   SpO2 95%  Body mass index is 37.59 kg/m².    General: Alert, pleasant, obese habitus, well nourished, well developed male in NAD  HEENT: Normocephalic. Eyes conjunctiva clear lids without ptosis, pupils equal and reactive to light, ears normal shape and contour, canals are clear bilaterally, tympanic membranes are pearly gray with good light reflex, nasal mucosa without erythema and drainage, oropharynx is without erythema, edema or exudates.   Neck: Supple without bruit. Thyroid is not enlarged.  Pulmonary: Clear to ausculation.  Normal effort. No rales, ronchi, or wheezing.  Cardiovascular: Normal rate and rhythm without murmur. Carotid and radial pulses are intact and equal bilaterally.  No lower extremity edema.  Abdomen: Soft, nontender, nondistended. Normal bowel sounds. Liver and spleen are not palpable.  No rebound tenderness, guarding, protrusions.  Left shoulder: No erythema or edema, nontender to palpation.  Full active range of motion.  " Upper extremity strength equal bilaterally.  Neurologic: Grossly nonfocal  Lymph: No cervical or supraclavicular lymph nodes are palpable  Skin: Warm and dry.    Musculoskeletal: Normal gait.   Psych: Normal mood and affect. Alert and oriented. Judgment and insight is normal.    Please note that this dictation was created using voice recognition software. I have made every reasonable attempt to correct obvious errors, but I expect that there are errors of grammar and possibly content that I did not discover before finalizing the note.      Assessment/Plan  1. Type 2 diabetes mellitus without complication, without long-term current use of insulin (HCC)  Improved control.  Continue with lifestyle measures.  We will continue to monitor.  Did advise to add routine aerobic exercise as tolerated.  - POCT  A1C  - HEMOGLOBIN A1C; Future    2. Essential hypertension  Blood pressure mildly elevated today.  Advised patient to take his blood pressure 3-4 times a week and record readings.  If readings are consistently greater than 140/90, patient will move up next appointment.  Otherwise, continue with amlodipine and lisinopril at current dose.  Reviewed lifestyle measures.  - lisinopril (PRINIVIL) 20 MG Tab; Take 1 Tab by mouth every day.  Dispense: 90 Tab; Refill: 1  - amLODIPine (NORVASC) 5 MG Tab; Take 1 Tab by mouth every day.  Dispense: 90 Tab; Refill: 1  - Comp Metabolic Panel; Future  - ESTIMATED GFR; Future    3. Hyperlipidemia, unspecified hyperlipidemia type  Poorly controlled.  Patient mistakenly stopped atorvastatin over a year ago.  ASCVD risk reviewed with patient.  He does have history of TIA.  He denies side effects from atorvastatin in the past.  He will start atorvastatin daily.  - atorvastatin (LIPITOR) 40 MG Tab; Take 1 Tab by mouth every bedtime.  Dispense: 90 Tab; Refill: 1  - Lipid Profile; Future    4. Chronic left shoulder pain  Patient having left shoulder pain, worsening.  Will get x-ray.  Discussed  referral to physical therapy or orthopedics.  Patient would like to work on comfort measures including ice, heat, gentle stretching, over-the-counter topicals.  He will get x-ray if pain does not improve.  He will let me know if he would like referral to either physical therapy or orthopedics.  - DX-SHOULDER 2+ LEFT; Future    5. Left lower quadrant abdominal pain  No protrusion updated on exam today.  Patient symptomatic of hernia.  Will get ultrasound if symptoms do not improve in the next week.  - US-HERNIA ABDOMEN; Future    6. Obesity (BMI 30-39.9)  Congratulated patient on his weight loss.  Encouraged to continue.  - Patient identified as having weight management issue.  Appropriate orders and counseling given.    7. TIA (transient ischemic attack)  History of TIA.  Continue with daily aspirin.  Restart atorvastatin.    Patient will return to clinic in 6 months for review of chronic health problems or sooner if needed.

## 2020-04-21 NOTE — ASSESSMENT & PLAN NOTE
This is a chronic health problem that is well controlled with current lifestyle measures.  Patient has been working on weight loss.  Has lost 12 pounds since the new year.  Has been working on diabetic diet.  A1c is improved to 5.8% today.

## 2020-04-21 NOTE — ASSESSMENT & PLAN NOTE
"This is a chronic health problem that is uncontrolled with current medications and lifestyle measures.  Has not been taking his atorvastatin for over a year.  Reports \"I just kept forgetting to take it.\"  Denies side effects while he was taking it over a year ago.  History of TIA.  Total cholesterol is 221, triglycerides 78, HDL 43, .  The 10-year ASCVD risk score (Lawrence JOHNSON Jr., et al., 2013) is: 20.8%        "

## 2020-04-22 ENCOUNTER — HOSPITAL ENCOUNTER (OUTPATIENT)
Dept: RADIOLOGY | Facility: MEDICAL CENTER | Age: 60
End: 2020-04-22
Attending: NURSE PRACTITIONER
Payer: COMMERCIAL

## 2020-04-22 DIAGNOSIS — R10.32 LEFT LOWER QUADRANT ABDOMINAL PAIN: ICD-10-CM

## 2020-04-22 PROCEDURE — 76705 ECHO EXAM OF ABDOMEN: CPT

## 2020-05-01 ENCOUNTER — TELEPHONE (OUTPATIENT)
Dept: MEDICAL GROUP | Facility: PHYSICIAN GROUP | Age: 60
End: 2020-05-01

## 2021-02-03 DIAGNOSIS — I10 ESSENTIAL HYPERTENSION: ICD-10-CM

## 2021-02-04 ENCOUNTER — OFFICE VISIT (OUTPATIENT)
Dept: MEDICAL GROUP | Facility: PHYSICIAN GROUP | Age: 61
End: 2021-02-04
Payer: COMMERCIAL

## 2021-02-04 VITALS
HEIGHT: 64 IN | DIASTOLIC BLOOD PRESSURE: 80 MMHG | RESPIRATION RATE: 14 BRPM | HEART RATE: 74 BPM | WEIGHT: 223 LBS | SYSTOLIC BLOOD PRESSURE: 122 MMHG | OXYGEN SATURATION: 96 % | TEMPERATURE: 98.3 F | BODY MASS INDEX: 38.07 KG/M2

## 2021-02-04 DIAGNOSIS — G89.29 CHRONIC LEFT SHOULDER PAIN: ICD-10-CM

## 2021-02-04 DIAGNOSIS — G47.33 OSA (OBSTRUCTIVE SLEEP APNEA): ICD-10-CM

## 2021-02-04 DIAGNOSIS — R10.32 LEFT LOWER QUADRANT ABDOMINAL PAIN: ICD-10-CM

## 2021-02-04 DIAGNOSIS — M25.512 CHRONIC LEFT SHOULDER PAIN: ICD-10-CM

## 2021-02-04 DIAGNOSIS — E11.9 TYPE 2 DIABETES MELLITUS WITHOUT COMPLICATION, WITHOUT LONG-TERM CURRENT USE OF INSULIN (HCC): ICD-10-CM

## 2021-02-04 DIAGNOSIS — E78.5 HYPERLIPIDEMIA, UNSPECIFIED HYPERLIPIDEMIA TYPE: ICD-10-CM

## 2021-02-04 DIAGNOSIS — I10 ESSENTIAL HYPERTENSION: ICD-10-CM

## 2021-02-04 LAB
HBA1C MFR BLD: 5.9 % (ref 0–5.6)
INT CON NEG: ABNORMAL
INT CON POS: ABNORMAL

## 2021-02-04 PROCEDURE — 99214 OFFICE O/P EST MOD 30 MIN: CPT | Performed by: NURSE PRACTITIONER

## 2021-02-04 PROCEDURE — 83036 HEMOGLOBIN GLYCOSYLATED A1C: CPT | Performed by: NURSE PRACTITIONER

## 2021-02-04 RX ORDER — AMLODIPINE BESYLATE 5 MG/1
5 TABLET ORAL
Qty: 90 TAB | Refills: 1 | Status: SHIPPED | OUTPATIENT
Start: 2021-02-04 | End: 2021-08-11 | Stop reason: SDUPTHER

## 2021-02-04 RX ORDER — LISINOPRIL 20 MG/1
20 TABLET ORAL
Qty: 90 TAB | Refills: 1 | Status: SHIPPED | OUTPATIENT
Start: 2021-02-04 | End: 2021-08-11 | Stop reason: SDUPTHER

## 2021-02-04 RX ORDER — AMLODIPINE BESYLATE 5 MG/1
TABLET ORAL
Qty: 90 TAB | Refills: 1 | OUTPATIENT
Start: 2021-02-04

## 2021-02-04 RX ORDER — ATORVASTATIN CALCIUM 40 MG/1
40 TABLET, FILM COATED ORAL
Qty: 90 TAB | Refills: 1 | Status: SHIPPED | OUTPATIENT
Start: 2021-02-04 | End: 2021-08-11 | Stop reason: SDUPTHER

## 2021-02-04 ASSESSMENT — PATIENT HEALTH QUESTIONNAIRE - PHQ9: CLINICAL INTERPRETATION OF PHQ2 SCORE: 0

## 2021-02-04 NOTE — ASSESSMENT & PLAN NOTE
Chronic health problem, well-controlled with lifestyle measures.  Point-of-care hemoglobin A1c is 5.9%.  Exercising by doing karate and biking.  Denies polydipsia, polyuria.  On ACE and statin.

## 2021-02-04 NOTE — ASSESSMENT & PLAN NOTE
Resolved.  4/22/2020 11:18 AM     HISTORY/REASON FOR EXAM:  Pain  Intermittent left lower quadrant pain     TECHNIQUE/EXAM DESCRIPTION AND NUMBER OF VIEWS:  Sonographic images of the left lower quadrant.     COMPARISON: None     FINDINGS:  Sonographic images of the left lower quadrant do not demonstrate any solid or cystic mass. No hernia is identified.     IMPRESSION:     No abnormalities identified.

## 2021-02-04 NOTE — ASSESSMENT & PLAN NOTE
This is a chronic health problem that is well controlled with current medications and lifestyle measures.  Taking amlodipine 5 mg daily and lisinopril 20 mg daily.  Exercising.  Does not smoke.  The patient denies chest pain, shortness of breath, headache, vision changes, epistaxis, or dyspnea on exertion.

## 2021-02-04 NOTE — ASSESSMENT & PLAN NOTE
Chronic, intermittent problem.  Evaluated by a provider at [facility].  Told that it was bursitis.  Manages with NSAIDs and heat if needed.

## 2021-03-11 NOTE — PROGRESS NOTES
CC: Telemedicine sleep consultation    This sleep consultation is provided using Telemedicine and secure encrypted software. Consent was obtained.    Consulting MD: Dylan Cantor M.D.  Requesting Physician: NABILA Thomas  Patient name:      Estiven Singer    Please note that I could not evaluate the patient in person at the site. All examination and evaluation of the patient and information gathering from the patient and/or the family were done jointly by me via licensed and securely encrypted tele-video communication equipment with the assistance of a trained tele-presenter at the originating site.  As such, my assessments and recommendations are limited as far as such telecommunication allows.    HPI:  Estiven Singer is a 60 y.o.former teacher for [employer] kindly referred by NABILA Thomas for previously diagnosed KEVIN not on CPAP. Evidently he stopped using his unit as he could not get new supplies and the old ones fell apart.  He is a former [facility] patient and prior PSG showed an AHI of 76/petra saturation 74%.  He was successfully managed with auto titrating CPAP 8 to 15 cm water with a resultant average estimated apnea-hypopnea index of 5.3 back in 2014.  He is anxious to get a new DME company. No supplies in 1.5 years. Without the machine can't sleep on his back.    He has no recent compliance report as he has not recently used his device.    The patient reports improved symptoms using positive airway pressure.  Has experienced no significant problems with mask fit, mask leak, pressure tolerance, excessive airway dryness, nasal congestion, aerophagia, or chest pain.    Significant comorbidities and modifying factors include chronic left shoulder pain, type 2 diabetes, hypertension, secondhand smoke exposure, dyslipidemia, obesity, vitamin D deficiency, and TIA.    Patient Active Problem List    Diagnosis Date Noted   • Hyperlipemia 04/29/2015   • HTN  (hypertension) 01/08/2015   • Family history of early CAD 01/08/2015   • Obesity (BMI 30-39.9) 04/21/2020   • Chronic left shoulder pain 04/21/2020   • Left lower quadrant abdominal pain 04/21/2020   • Left hip pain 09/05/2018   • TIA (transient ischemic attack) 11/17/2016   • Vitamin D insufficiency 04/29/2015   • Type 2 diabetes mellitus (HCC) 01/08/2015   • KEVIN (obstructive sleep apnea) 01/08/2015       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 1/9/2015        Past Surgical History:   Procedure Laterality Date   • CATARACT EXTRACTION WITH IOL Bilateral 2018   • SHOULDER ARTHROSCOPY  5/20/2010    Performed by XIMENA KANG at [facility]   • DEBRIDEMENT  5/20/2010    Performed by XIMENA KANG at [facility]   • ROTATOR CUFF REPAIR  5/20/2010    Performed by XIMENA KANG at [facility]       Family History   Problem Relation Age of Onset   • Heart Disease Mother    • Cancer Father    • Heart Disease Maternal Aunt    • Heart Disease Maternal Uncle    • Stroke Brother    • Sleep Apnea Neg Hx        Social History     Socioeconomic History   • Marital status: Single     Spouse name: Not on file   • Number of children: Not on file   • Years of education: Not on file   • Highest education level: Not on file   Occupational History   • Not on file   Tobacco Use   • Smoking status: Passive Smoke Exposure - Never Smoker   • Smokeless tobacco: Never Used   Substance and Sexual Activity   • Alcohol use: Yes     Alcohol/week: 0.6 oz     Types: 1 Glasses of wine per week     Comment: seldom   • Drug use: No   • Sexual activity: Not Currently   Other Topics Concern   • Not on file   Social History Narrative   • Not on file     Social  Determinants of Health     Financial Resource Strain:    • Difficulty of Paying Living Expenses:    Food Insecurity:    • Worried About Running Out of Food in the Last Year:    • Ran Out of Food in the Last Year:    Transportation Needs:    • Lack of Transportation (Medical):    • Lack of Transportation (Non-Medical):    Physical Activity:    • Days of Exercise per Week:    • Minutes of Exercise per Session:    Stress:    • Feeling of Stress :    Social Connections:    • Frequency of Communication with Friends and Family:    • Frequency of Social Gatherings with Friends and Family:    • Attends [employer] Services:    • Active Member of Clubs or Organizations:    • Attends Club or Organization Meetings:    • Marital Status:    Intimate Partner Violence:    • Fear of Current or Ex-Partner:    • Emotionally Abused:    • Physically Abused:    • Sexually Abused:        Current Outpatient Medications   Medication Sig Dispense Refill   • lisinopril (PRINIVIL) 20 MG Tab Take 1 Tab by mouth every day. 90 Tab 1   • amLODIPine (NORVASC) 5 MG Tab Take 1 Tab by mouth every day. 90 Tab 1   • atorvastatin (LIPITOR) 40 MG Tab Take 1 Tab by mouth every bedtime. 90 Tab 1   • aspirin EC (ECOTRIN) 325 MG Tablet Delayed Response TAKE 1 TABLET BY MOUTH EVERY DAY 90 Tab 1   • Blood Glucose Monitoring Suppl Device Meter: Dispense Device of Insurance Preference. Sig. Use as directed for blood sugar monitoring. #1. NR. (Patient not taking: Reported on 3/16/2021) 1 Device 0   • Blood Glucose Monitoring Suppl Supplies Misc Test strips order: Test strips for patient's meter. Sig: use tid and prn ssx high or low sugar #100 RF x 3 (Patient not taking: Reported on 3/16/2021) 100 Strip 3   • Lancets Misc Lancets order: Lancets for  meter. Sig: use fasting and prn ssx high or low sugar. #100 RF x 3 (Patient not taking: Reported on 3/16/2021) 100 Each 3   • PREVIDENT 5000 BOOSTER PLUS 1.1 % Paste   3     No current facility-administered medications  "for this visit.    \"CURRENT RX\"    ALLERGIES: Patient has no known allergies.    ROS    Constitutional: Denies fever, chills, sweats,  weight loss, fatigue.  Eyes: Denies vision loss, pain, drainage, double vision, glasses.  Ears/Nose/Mouth/Throat: Denies earache, difficulty hearing, rhinitis/nasal congestion, injury, recurrent sore throat, persistent hoarseness, decayed teeth/toothaches, ringing or buzzing in the ears.  Cardiovascular: Denies chest pain, tightness, palpitations, swelling in legs/feet, fainting, difficulty breathing when lying down but gets better when sitting up.   Respiratory: Denies shortness of breath, cough, sputum, wheezing, painful breathing, coughing up blood.   Sleep: per HPI  Gastrointestinal: Denies  difficulty swallowing, nausea, abdominal pain, diarrhea, constipation, heartburn.  Genitourinary: Denies  blood in urine, discharge, frequent urination.   Musculoskeletal: Denies painful joints, sore muscles, back pain.   Integumentary: Denies rashes, lumps, color changes.   Neurological: Denies frequent headaches,weakness, dizziness.    PHYSICAL EXAM  Obese  Pules 79  Temp 97.9 degrees  RR 16  Sat 96%    /74 (BP Location: Left arm, Patient Position: Sitting, BP Cuff Size: Adult)   Ht 1.626 m (5' 4\")   Wt 97.5 kg (215 lb)   BMI 36.90 kg/m²   Appearance: Well-nourished, well-developed, no acute distress  Eyes:  PERRLA, EOMI  ENMT: Mask on  Neck: Supple, trachea midline, no masses  Respiratory effort:  No intercostal retractions or use of accessory muscles  Lung auscultation:  No wheezes rhonchi rubs or rales  Cardiac: No murmurs, rubs, or gallops; regular rhythm, normal rate; no edema  Abdomen:  No tenderness, no organomegaly  Musculoskeletal:  Grossly normal; gait and station normal; digits and nails normal  Skin:  No rashes, petechiae, cyanosis  Neurologic: without focal signs; oriented to person, time, place, and purpose; judgement intact  Psychiatric:  No depression, anxiety, " agitation      Medical decision making  The medical record was reviewed in its entirety including the referral notes, records from primary care, consultants notes, hospital records, lab, imaging, microbiology, immunology, and immunizations.  Care gaps identified and reviewed with the patient.    Diagnostic and titration nocturnal polysomnogram's, home sleep apnea tests, continuous nocturnal oximetry results, multiple sleep latency tests, and compliance reports reviewed.    There are no diagnoses linked to this encounter.    1. KEVIN (obstructive sleep apnea)      - DME Mask and Supplies    2. TIA (transient ischemic attack)      3. Type 2 diabetes mellitus without complication, without long-term current use of insulin (HCC)      4. Vitamin D insufficiency      5. Obesity (BMI 30-39.9)      6. Hyperlipidemia, unspecified hyperlipidemia type      7. Essential hypertension      PLAN:     Has been advised to continue the current AutoPap 8-15 cm water, clean equipment frequently, and get new mask and supplies as allowed by insurance and DME. Advised about potential problems including nasal obstruction/stuffiness, mask leak or discomfort , frequent awakenings, chill or dryness of the upper airway, noise, inconvenience, and lack of benefit. Recommend an earlier appointment, if significant treatment barriers develop.      The risks of untreated sleep apnea were discussed with the patient at length. Patients with KEVIN are at increased risk of cardiovascular disease including coronary artery disease, systemic arterial hypertension, pulmonary arterial hypertension, cardiac arrythmias, and stroke. KEVIN patients have an increased risk of motor vehicle accidents, type 2 diabetes, chronic kidney disease, and non-alcoholic liver disease. The patient was advised to avoid driving a motor vehicle when drowsy.    Positive airway pressure will favorably impact many of the adverse conditions and effects provoked by KEVIN.    Have advised the  patient to follow up with the appropriate healthcare practitioners for all other medical problems and issues.    Mask wearing, handwashing, and social distancing protocols reviewed and encouraged.      Return in about 1 year (around 3/16/2022).

## 2021-03-16 ENCOUNTER — TELEMEDICINE2 (OUTPATIENT)
Dept: SLEEP MEDICINE | Facility: MEDICAL CENTER | Age: 61
End: 2021-03-16
Payer: COMMERCIAL

## 2021-03-16 VITALS
SYSTOLIC BLOOD PRESSURE: 122 MMHG | WEIGHT: 215 LBS | DIASTOLIC BLOOD PRESSURE: 74 MMHG | BODY MASS INDEX: 36.7 KG/M2 | HEIGHT: 64 IN

## 2021-03-16 DIAGNOSIS — E55.9 VITAMIN D INSUFFICIENCY: ICD-10-CM

## 2021-03-16 DIAGNOSIS — E78.5 HYPERLIPIDEMIA, UNSPECIFIED HYPERLIPIDEMIA TYPE: ICD-10-CM

## 2021-03-16 DIAGNOSIS — E66.9 OBESITY (BMI 30-39.9): ICD-10-CM

## 2021-03-16 DIAGNOSIS — I10 ESSENTIAL HYPERTENSION: ICD-10-CM

## 2021-03-16 DIAGNOSIS — G47.33 OSA (OBSTRUCTIVE SLEEP APNEA): ICD-10-CM

## 2021-03-16 DIAGNOSIS — E11.9 TYPE 2 DIABETES MELLITUS WITHOUT COMPLICATION, WITHOUT LONG-TERM CURRENT USE OF INSULIN (HCC): ICD-10-CM

## 2021-03-16 DIAGNOSIS — G45.9 TIA (TRANSIENT ISCHEMIC ATTACK): ICD-10-CM

## 2021-03-16 PROCEDURE — 99213 OFFICE O/P EST LOW 20 MIN: CPT | Mod: 95,CR | Performed by: INTERNAL MEDICINE

## 2021-08-11 ENCOUNTER — OFFICE VISIT (OUTPATIENT)
Dept: MEDICAL GROUP | Facility: PHYSICIAN GROUP | Age: 61
End: 2021-08-11
Payer: COMMERCIAL

## 2021-08-11 VITALS
SYSTOLIC BLOOD PRESSURE: 124 MMHG | HEIGHT: 64 IN | WEIGHT: 222.9 LBS | TEMPERATURE: 98.7 F | DIASTOLIC BLOOD PRESSURE: 74 MMHG | OXYGEN SATURATION: 96 % | BODY MASS INDEX: 38.06 KG/M2 | RESPIRATION RATE: 16 BRPM | HEART RATE: 70 BPM

## 2021-08-11 DIAGNOSIS — E78.5 HYPERLIPIDEMIA, UNSPECIFIED HYPERLIPIDEMIA TYPE: ICD-10-CM

## 2021-08-11 DIAGNOSIS — I10 ESSENTIAL HYPERTENSION: ICD-10-CM

## 2021-08-11 DIAGNOSIS — E11.9 TYPE 2 DIABETES MELLITUS WITHOUT COMPLICATION, WITHOUT LONG-TERM CURRENT USE OF INSULIN (HCC): ICD-10-CM

## 2021-08-11 PROCEDURE — 99214 OFFICE O/P EST MOD 30 MIN: CPT | Performed by: NURSE PRACTITIONER

## 2021-08-11 RX ORDER — AMLODIPINE BESYLATE 5 MG/1
5 TABLET ORAL
Qty: 90 TABLET | Refills: 1 | Status: SHIPPED | OUTPATIENT
Start: 2021-08-11 | End: 2022-02-01 | Stop reason: SDUPTHER

## 2021-08-11 RX ORDER — ATORVASTATIN CALCIUM 40 MG/1
40 TABLET, FILM COATED ORAL
Qty: 90 TABLET | Refills: 1 | Status: SHIPPED | OUTPATIENT
Start: 2021-08-11 | End: 2022-02-01 | Stop reason: SDUPTHER

## 2021-08-11 RX ORDER — LISINOPRIL 20 MG/1
20 TABLET ORAL
Qty: 90 TABLET | Refills: 1 | Status: SHIPPED | OUTPATIENT
Start: 2021-08-11 | End: 2022-02-01 | Stop reason: SDUPTHER

## 2021-08-11 NOTE — ASSESSMENT & PLAN NOTE
This is a chronic health problem that is well controlled with current medications and lifestyle measures.  Taking lisinopril 20 mg daily and amlodipine 5 mg daily.  Has been doing karate 3 times a week for exercise.  The patient denies chest pain, shortness of breath, headache, vision changes, epistaxis, or dyspnea on exertion.

## 2021-08-11 NOTE — LETTER
Varthana  NABILA Thomas  [address] [place] [address]  Fax: [phone]   Authorization for Release/Disclosure of   Protected Health Information   Name: ESTIVEN SINGER : 1960 SSN: [id number]   Address: [address] [place] [address] Phone:    [phone] (home)    I authorize the entity listed below to release/disclose the PHI below to:   Varthana/NABILA Thomas and NABILA Thomas   Provider or Entity Name:     Address   City, State, Zip   Phone:      Fax:     Reason for request: continuity of care   Information to be released:    [  ] LAST COLONOSCOPY,  including any PATH REPORT and follow-up  [  ] LAST FIT/COLOGUARD RESULT [  ] LAST DEXA  [  ] LAST MAMMOGRAM  [  ] LAST PAP  [  ] LAST LABS [  ] RETINA EXAM REPORT  [  ] IMMUNIZATION RECORDS  [  ] Release all info      [  ] Check here and initial the line next to each item to release ALL health information INCLUDING  _____ Care and treatment for drug and / or alcohol abuse  _____ HIV testing, infection status, or AIDS  _____ Genetic Testing    DATES OF SERVICE OR TIME PERIOD TO BE DISCLOSED: _____________  I understand and acknowledge that:  * This Authorization may be revoked at any time by you in writing, except if your health information has already been used or disclosed.  * Your health information that will be used or disclosed as a result of you signing this authorization could be re-disclosed by the recipient. If this occurs, your re-disclosed health information may no longer be protected by State or Federal laws.  * You may refuse to sign this Authorization. Your refusal will not affect your ability to obtain treatment.  * This Authorization becomes effective upon signing and will  on (date) __________.      If no date is indicated, this Authorization will  one (1) year from the signature date.    Name: Estiven Singer    Signature:   Date:     2021       PLEASE  FAX REQUESTED RECORDS BACK TO: [phone]

## 2021-08-11 NOTE — ASSESSMENT & PLAN NOTE
Chronic health problem, uncertain of control.  Taking atorvastatin 40 mg daily.  Exercising by doing karate 3 times a week.  Has gained weight, but has been working on weight loss.  Has lost 4 pounds in the last week.

## 2021-08-11 NOTE — ASSESSMENT & PLAN NOTE
Chronic health problem, uncertain control.  Managed by lifestyle measures.  Patient does admit to being more sedentary and weight gain in the last few months.  Does not check his blood sugar at home.  He is due for labs.  Had retinal scan back in January, reportedly normal.  Will request records.  Denies polydipsia, polyuria, vision changes.

## 2021-08-31 LAB
ALBUMIN SERPL-MCNC: 4.6 G/DL (ref 3.8–4.9)
ALBUMIN/CREAT UR: 5 MG/G CREAT (ref 0–29)
ALBUMIN/GLOB SERPL: 2.1 {RATIO} (ref 1.2–2.2)
ALP SERPL-CCNC: 82 IU/L (ref 48–121)
ALT SERPL-CCNC: 21 IU/L (ref 0–44)
AST SERPL-CCNC: 18 IU/L (ref 0–40)
BILIRUB SERPL-MCNC: 0.6 MG/DL (ref 0–1.2)
BUN SERPL-MCNC: 13 MG/DL (ref 6–24)
BUN/CREAT SERPL: 14 (ref 9–20)
CALCIUM SERPL-MCNC: 9.6 MG/DL (ref 8.7–10.2)
CHLORIDE SERPL-SCNC: 103 MMOL/L (ref 96–106)
CHOLEST SERPL-MCNC: 137 MG/DL (ref 100–199)
CO2 SERPL-SCNC: 22 MMOL/L (ref 20–29)
CREAT SERPL-MCNC: 0.96 MG/DL (ref 0.76–1.27)
CREAT UR-MCNC: 218.5 MG/DL
GLOBULIN SER CALC-MCNC: 2.2 G/DL (ref 1.5–4.5)
GLUCOSE SERPL-MCNC: 149 MG/DL (ref 65–99)
HBA1C MFR BLD: 6.7 % (ref 4.8–5.6)
HDLC SERPL-MCNC: 40 MG/DL
LABORATORY COMMENT REPORT: NORMAL
LDLC SERPL CALC-MCNC: 80 MG/DL (ref 0–99)
MICROALBUMIN UR-MCNC: 9.9 UG/ML
POTASSIUM SERPL-SCNC: 4.4 MMOL/L (ref 3.5–5.2)
PROT SERPL-MCNC: 6.8 G/DL (ref 6–8.5)
SODIUM SERPL-SCNC: 142 MMOL/L (ref 134–144)
TRIGL SERPL-MCNC: 88 MG/DL (ref 0–149)
TSH SERPL DL<=0.005 MIU/L-ACNC: 1.48 UIU/ML (ref 0.45–4.5)
VLDLC SERPL CALC-MCNC: 17 MG/DL (ref 5–40)

## 2021-09-12 ENCOUNTER — TELEPHONE (OUTPATIENT)
Dept: MEDICAL GROUP | Facility: PHYSICIAN GROUP | Age: 61
End: 2021-09-12

## 2021-09-16 ENCOUNTER — OFFICE VISIT (OUTPATIENT)
Dept: MEDICAL GROUP | Facility: PHYSICIAN GROUP | Age: 61
End: 2021-09-16
Payer: COMMERCIAL

## 2021-09-16 VITALS
OXYGEN SATURATION: 96 % | WEIGHT: 228.8 LBS | HEART RATE: 70 BPM | DIASTOLIC BLOOD PRESSURE: 78 MMHG | RESPIRATION RATE: 16 BRPM | SYSTOLIC BLOOD PRESSURE: 134 MMHG | HEIGHT: 65 IN | BODY MASS INDEX: 38.12 KG/M2 | TEMPERATURE: 98.5 F

## 2021-09-16 DIAGNOSIS — E78.5 HYPERLIPIDEMIA, UNSPECIFIED HYPERLIPIDEMIA TYPE: ICD-10-CM

## 2021-09-16 DIAGNOSIS — E11.9 TYPE 2 DIABETES MELLITUS WITHOUT COMPLICATION, WITHOUT LONG-TERM CURRENT USE OF INSULIN (HCC): ICD-10-CM

## 2021-09-16 PROCEDURE — 99213 OFFICE O/P EST LOW 20 MIN: CPT | Performed by: NURSE PRACTITIONER

## 2021-09-16 NOTE — PATIENT INSTRUCTIONS
2400 Calorie Diet for Diabetes Meal Planning  The 2400 calorie diet is designed for eating up to 2400 calories each day. Following this diet and making healthy meal choices can help improve overall health. This diet controls blood sugar (glucose) levels and can also lower blood pressure and cholesterol.   SERVING SIZES  Measuring foods and serving sizes helps to make sure you are getting the right amount of food. The list below tells how big or small some common serving sizes are.  · 1 oz.........4 stacked dice.   · 3 oz.........Deck of cards.   · 1 tsp........Tip of little finger.   · 1 tbs........Thumb.   · 2 tbs........Golf ball.   · ½ cup.......Half of a fist.   · 1 cup........A fist.   GUIDELINES FOR CHOOSING FOODS  The goal of this diet is to eat a variety of foods and limit calories to 2400 each day. This can be done by choosing foods that are low in calories and in fat. The diet also suggests eating small amounts of food often. Doing this helps control your blood glucose levels so they do not get too high or low. Each meal or snack should contain a protein food source to help you feel more satisfied and to stabilize your blood glucose. Try to eat about the same amount of food around the same time each day. This includes weekend days, travel days, and days off work. Space your meals about 4 to 5 hours apart and add a snack between them if you wish.   For example, a daily food plan could include breakfast, a morning snack, lunch, dinner, and an evening snack. Healthy meals and snacks include whole grains, vegetables, fruits, lean meats, poultry, fish, and dairy products. As you plan your meals, choose a variety of foods. Choose from the bread and starches, vegetables, fruit, dairy, and meat/protein groups. Examples of foods from each group are listed below with their suggested serving sizes. Use measuring cups and spoons to become familiar with what a healthy portion looks like.  Bread and Starch  Each serving  equals 15 grams of carbohydrates.  · 1 slice bread.   · ¼ bagel.   · ¾ cup cold cereal (unsweetened).   · ½ cup hot cereal or mashed potatoes.   · 1 small potato (size of a computer mouse).   ·  cup cooked pasta or rice.   · ½ English muffin.   · 1 cup broth-based soup.   · 3 cups of popcorn.   · 4 to 6 whole-wheat crackers.   · ½ cup cooked beans, peas, or corn.   Vegetable  Each serving equals 5 grams of carbohydrates.  · ½ cup cooked vegetables.   · 1 cup raw vegetables.   · ½ cup tomato or vegetable juice.   Fruit  Each serving equals 15 grams of carbohydrates.  · 1 small apple or orange.   · 1¼ cup watermelon or strawberries.   · ½ cup applesauce (no sugar added).   · 2 tbs raisins.   · ½ banana.   · ½ cup canned fruit, packed in water, in its own juice, or sweetened with a sugar substitute.   · ½ cup unsweetened fruit juice.   Dairy  Each serving equals 12 to 15 grams of carbohydrates.  · 1 cup fat-free milk.   · 6 oz artificially sweetened yogurt or plain yogurt.   · 1 cup low-fat buttermilk.   · 1 cup soy milk.   Meat/Protein  · 1 large egg.   · 2 to 3 oz meat, poultry, or fish.   · ½ cup low-fat cottage cheese.   · 1 tbs peanut butter.   · ½ cup tofu.   · 1 oz low-fat cheese.   · ¼ cup canned tuna in water.   Fat  · 1 tsp oil.   · 1 tsp trans-fat-free margarine.   · 1 tsp butter.   · 1 tsp mayonnaise.   · 2 tbs avocado.   SAMPLE 2400 CALORIE DIET PLAN  Breakfast  · 1 English muffin (2 carb servings).   · 1 scrambled egg.   · 1 tsp margarine.   · 1 cup fat-free milk (1 carb serving).   · 1 large orange (2 carb servings).   Morning Snack  · ¼ cup low-fat cottage cheese.   · ½ cup canned peaches in juice (1 carb serving).   · 1 cup carrot sticks.   Lunch  · Grilled chicken salad.   · 2 oz chicken breast.   · 1 cup julianen lettuce or spinach.   · ½ cup diced tomato.   · ½ cup shredded carrots.   · ¼ cup sliced cucumbers.   · 2 tbs low-fat salad dressing.   · 2 slices whole-wheat bread (2 carb servings).   · 1  small apple (1 carb serving).   · 1 cup fat-free milk (1 carb serving).   · 15 baked chips ( 1 carb serving).   Afternoon Snack  · 8 reduced fat crackers (2 carb servings).   · 2 tbs peanut butter.   Dinner  · 3 oz salmon, broiled.   · 4½ small red potatoes, roasted with 1 tsp olive oil and seasoning (3 carb servings).   · 1 cup green beans.   · 1¼ cup strawberries (1 carb serving).   · 1 cup fat-free milk (1 carb serving).   Evening Snack  · 6 cups air-popped popcorn (2 carb servings).   · 2 tbs parmesan cheese sprinkled on top.   · 8 almonds.   MEAL PLAN  Use this worksheet to help you make a daily meal plan based on the 2400 calorie diet suggestions. The total amount of carbohydrates in your meal or snack is more important than making sure you include all of the food groups at every meal or snack. If you are using this plan to help you control your blood glucose, you may interchange carbohydrate-containing foods (dairy, starches, and fruits). Choose a variety of fresh foods of varying colors and flavors. You can choose from the following foods to build your day's meals:  · 12 Starches.   · 4 Vegetables.   · 4 Fruits.   · 3 Dairy.   · 7 oz Meat/Protein.   · Up to 8 Fats.   Your dietician can use this worksheet to help you decide how many servings and what types of foods are right for you.  BREAKFAST  Food Group and Servings / Food Choice  Starch ____________________________________________________  Dairy _____________________________________________________  Fruit _____________________________________________________  Meat/Protein ______________________________________________  Fat________________________________________________________  LUNCH  Food Group and Servings / Food Choice   Starch ___________________________________________________  Meat/Protein _____________________________________________  Vegetable ________________________________________________  Fruit  _____________________________________________________  Dairy ____________________________________________________  Fat_______________________________________________________  AFTERNOON SNACK  Food Group and Servings / Food Choice  Starch ___________________________________________________  Meat/Protein _____________________________________________  DINNER  Food Group and Servings / Food Choice  Starch ___________________________________________________  Meat/Protein _____________________________________________  Dairy ____________________________________________________  Vegetable ________________________________________________  Fruit _____________________________________________________  Fat_______________________________________________________  EVENING SNACK  Food Group and Servings / Food Choice  Fruit _____________________________________________________  Meat/Protein ______________________________________________  Starch ____________________________________________________  DAILY TOTALS  Starch __________________________  Vegetable _______________________  Fruits ___________________________  Dairy ___________________________  Meat/Protein ____________________  Fat _____________________________  Document Released: 07/10/2006 Document Revised: 03/11/2013 Document Reviewed: 11/02/2012  [employer]® Patient Information ©2013 [employer].

## 2021-09-16 NOTE — ASSESSMENT & PLAN NOTE
This is a chronic health problem that is well controlled with current medications and lifestyle measures.  Taking atorvastatin 40 mg daily.

## 2021-09-16 NOTE — ASSESSMENT & PLAN NOTE
Chronic health problem, managed with lifestyle measures.  A1c has crept up to 6.7% from 5.9% a year ago.  Patient does admit to being more sedentary and gaining weight.  Not being diligent about diabetic diet.  Does not check blood sugar at home.  Up-to-date on retinal scan.  On ACE and statin.  Denies polydipsia, polyuria, vision changes.

## 2021-09-16 NOTE — PROGRESS NOTES
CC: Lab review    HISTORY OF THE PRESENT ILLNESS: Patient is a 61 y.o. male. This pleasant patient is here today for evaluation and management of the following health problems.      Type 2 diabetes mellitus  Chronic health problem, managed with lifestyle measures.  A1c has crept up to 6.7% from 5.9% a year ago.  Patient does admit to being more sedentary and gaining weight.  Not being diligent about diabetic diet.  Does not check blood sugar at home.  Up-to-date on retinal scan.  On ACE and statin.  Denies polydipsia, polyuria, vision changes.    Hyperlipemia  This is a chronic health problem that is well controlled with current medications and lifestyle measures.  Taking atorvastatin 40 mg daily.      Allergies: Patient has no known allergies.    Current Outpatient Medications Ordered in Epic   Medication Sig Dispense Refill   • Cholecalciferol (VITAMIN D3 PO) Take  by mouth. 650% of daily allowance     • Calcium Carbonate (SUPER CALCIUM PO) Take  by mouth.     • POTASSIUM PO Take  by mouth.     • Multiple Vitamins-Minerals (CENTRUM SILVER 50+MEN PO) Take  by mouth.     • GLUCOSAMINE SULFATE PO Take  by mouth.     • lisinopril (PRINIVIL) 20 MG Tab Take 1 Tablet by mouth every day. 90 Tablet 1   • amLODIPine (NORVASC) 5 MG Tab Take 1 Tablet by mouth every day. 90 Tablet 1   • atorvastatin (LIPITOR) 40 MG Tab Take 1 Tablet by mouth at bedtime. 90 Tablet 1   • aspirin EC (ECOTRIN) 325 MG Tablet Delayed Response TAKE 1 TABLET BY MOUTH EVERY DAY 90 Tab 1     No current Epic-ordered facility-administered medications on file.       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 1/9/2015       Past Surgical History:   Procedure Laterality Date   • CATARACT EXTRACTION WITH IOL Bilateral 2018   • SHOULDER  "ARTHROSCOPY  5/20/2010    Performed by XIMENA KANG at SURGERY SAME DAY [facility]   • DEBRIDEMENT  5/20/2010    Performed by XIMENA KANG at SURGERY SAME DAY [facility]   • ROTATOR CUFF REPAIR  5/20/2010    Performed by XIMENA KANG at SURGERY SAME DAY [facility]       Social History     Tobacco Use   • Smoking status: Passive Smoke Exposure - Never Smoker   • Smokeless tobacco: Never Used   Vaping Use   • Vaping Use: Never used   Substance Use Topics   • Alcohol use: Yes     Alcohol/week: 0.6 oz     Types: 1 Glasses of wine per week     Comment: seldom   • Drug use: No       Family History   Problem Relation Age of Onset   • Heart Disease Mother    • Cancer Father    • Heart Disease Maternal Aunt    • Heart Disease Maternal Uncle    • Stroke Brother    • Sleep Apnea Neg Hx        ROS:   As in HPI, otherwise negative for chest pain, dyspnea, abdominal pain, dysuria, blood in stool, fever             Exam: /78 (BP Location: Right arm, Patient Position: Sitting, BP Cuff Size: Adult)   Pulse 70   Temp 36.9 °C (98.5 °F) (Temporal)   Resp 16   Ht 1.638 m (5' 4.5\")   Wt 104 kg (228 lb 12.8 oz)   SpO2 96%  Body mass index is 38.67 kg/m².    General: Alert, pleasant, obese habitus, well nourished, well developed male in NAD  Neck: Supple   Pulmonary: Clear to ausculation.  Normal effort. No rales, ronchi, or wheezing.  Cardiovascular: Normal rate and rhythm without murmur.  No lower extremity edema.  Neurologic: Grossly nonfocal  Skin: Warm and dry.    Musculoskeletal: Normal gait.   Psych: Normal mood and affect. Alert and oriented. Judgment and insight is normal.    Diabetic Foot Exam: No ulcers or skin lesions present, patient tested with a 10 g force and is sensitive bilaterally throughout the ball of the foot, great toe and heel.  Dorsalis pedis and posterior tibial pulses are present and intact bilaterally    Component      Latest Ref Rng & Units 8/30/2021   Glucose      65 - 99 " mg/dL 149 (H)   Bun      6 - 24 mg/dL 13   Creatinine      0.76 - 1.27 mg/dL 0.96   GFR If Non       >59 mL/min/1.73 86   GFR If       >59 mL/min/1.73 100   Bun-Creatinine Ratio      9 - 20 14   Sodium      134 - 144 mmol/L 142   Potassium      3.5 - 5.2 mmol/L 4.4   Chloride      96 - 106 mmol/L 103   Co2      20 - 29 mmol/L 22   Calcium      8.7 - 10.2 mg/dL 9.6   Total Protein      6.0 - 8.5 g/dL 6.8   Albumin      3.8 - 4.9 g/dL 4.6   Globulin      1.5 - 4.5 g/dL 2.2   A-G Ratio      1.2 - 2.2 2.1   Total Bilirubin      0.0 - 1.2 mg/dL 0.6   Alkaline Phosphatase      48 - 121 IU/L 82   AST(SGOT)      0 - 40 IU/L 18   ALT(SGPT)      0 - 44 IU/L 21   Cholesterol,Tot      100 - 199 mg/dL 137   Triglycerides      0 - 149 mg/dL 88   HDL      >39 mg/dL 40   VLDL Cholesterol Calc      5 - 40 mg/dL 17   LDL Chol Calc (NIH)      0 - 99 mg/dL 80   Comment:       CANCELED   Creatinine, Random Urine      Not Estab. mg/dL 218.5   Microalbumin, Urine Random      Not Estab. ug/mL 9.9   Microalbumin-Creatinine      0 - 29 mg/g creat 5   Glycohemoglobin      4.8 - 5.6 % 6.7 (H)   TSH      0.450 - 4.500 uIU/mL 1.480         Please note that this dictation was created using voice recognition software. I have made every reasonable attempt to correct obvious errors, but I expect that there are errors of grammar and possibly content that I did not discover before finalizing the note.      Assessment/Plan  1. Type 2 diabetes mellitus without complication, without long-term current use of insulin (HCC)  No abnormalities on foot exam today.  Reviewed good foot care.  A1c almost to uncontrolled range.  Reviewed risks of uncontrolled diabetes on kidneys, circulation, eyesight, heart.  Reviewed options with patient including medication management, lifestyle measures, diabetic diet, routine aerobic exercise as tolerated, weight loss.  Patient would like to work on lifestyle measures prior to consideration of  medication.  Reviewed plate method, diabetic diet printed for patient.  He will start doing his karate workouts more often.  Will recheck A1c in 3 months.  - HEMOGLOBIN A1C; Future  - Basic Metabolic Panel; Future  - ESTIMATED GFR; Future      2. Hyperlipidemia, unspecified hyperlipidemia type  Well-controlled.  Continue with atorvastatin, no changes.  Reviewed lifestyle measures.    Patient will return to clinic in 3 to 4 months for diabetes.

## 2022-01-18 ENCOUNTER — HOSPITAL ENCOUNTER (OUTPATIENT)
Dept: LAB | Facility: MEDICAL CENTER | Age: 62
End: 2022-01-18
Attending: NURSE PRACTITIONER
Payer: COMMERCIAL

## 2022-01-18 DIAGNOSIS — E11.9 TYPE 2 DIABETES MELLITUS WITHOUT COMPLICATION, WITHOUT LONG-TERM CURRENT USE OF INSULIN (HCC): ICD-10-CM

## 2022-01-18 LAB
ANION GAP SERPL CALC-SCNC: 14 MMOL/L (ref 7–16)
BUN SERPL-MCNC: 15 MG/DL (ref 8–22)
CALCIUM SERPL-MCNC: 9.7 MG/DL (ref 8.5–10.5)
CHLORIDE SERPL-SCNC: 104 MMOL/L (ref 96–112)
CHOLEST SERPL-MCNC: 180 MG/DL (ref 100–199)
CO2 SERPL-SCNC: 22 MMOL/L (ref 20–33)
CREAT SERPL-MCNC: 0.87 MG/DL (ref 0.5–1.4)
EST. AVERAGE GLUCOSE BLD GHB EST-MCNC: 128 MG/DL
FASTING STATUS PATIENT QL REPORTED: NORMAL
GLUCOSE SERPL-MCNC: 89 MG/DL (ref 65–99)
HBA1C MFR BLD: 6.1 % (ref 4–5.6)
HDLC SERPL-MCNC: 48 MG/DL
LDLC SERPL CALC-MCNC: 107 MG/DL
POTASSIUM SERPL-SCNC: 3.8 MMOL/L (ref 3.6–5.5)
SODIUM SERPL-SCNC: 140 MMOL/L (ref 135–145)
TRIGL SERPL-MCNC: 123 MG/DL (ref 0–149)
TSH SERPL DL<=0.005 MIU/L-ACNC: 2.13 UIU/ML (ref 0.38–5.33)

## 2022-01-18 PROCEDURE — 82570 ASSAY OF URINE CREATININE: CPT

## 2022-01-18 PROCEDURE — 83036 HEMOGLOBIN GLYCOSYLATED A1C: CPT

## 2022-01-18 PROCEDURE — 82043 UR ALBUMIN QUANTITATIVE: CPT

## 2022-01-18 PROCEDURE — 84443 ASSAY THYROID STIM HORMONE: CPT

## 2022-01-18 PROCEDURE — 80048 BASIC METABOLIC PNL TOTAL CA: CPT

## 2022-01-18 PROCEDURE — 80061 LIPID PANEL: CPT

## 2022-01-18 PROCEDURE — 36415 COLL VENOUS BLD VENIPUNCTURE: CPT

## 2022-01-19 LAB
CREAT UR-MCNC: 81.24 MG/DL
MICROALBUMIN UR-MCNC: <1.2 MG/DL
MICROALBUMIN/CREAT UR: NORMAL MG/G (ref 0–30)

## 2022-02-01 ENCOUNTER — OFFICE VISIT (OUTPATIENT)
Dept: MEDICAL GROUP | Facility: PHYSICIAN GROUP | Age: 62
End: 2022-02-01
Payer: COMMERCIAL

## 2022-02-01 VITALS
DIASTOLIC BLOOD PRESSURE: 72 MMHG | OXYGEN SATURATION: 95 % | WEIGHT: 210.3 LBS | RESPIRATION RATE: 16 BRPM | SYSTOLIC BLOOD PRESSURE: 130 MMHG | BODY MASS INDEX: 35.04 KG/M2 | HEIGHT: 65 IN | TEMPERATURE: 97.9 F | HEART RATE: 68 BPM

## 2022-02-01 DIAGNOSIS — E11.9 TYPE 2 DIABETES MELLITUS WITHOUT COMPLICATION, WITHOUT LONG-TERM CURRENT USE OF INSULIN (HCC): ICD-10-CM

## 2022-02-01 DIAGNOSIS — E78.5 HYPERLIPIDEMIA, UNSPECIFIED HYPERLIPIDEMIA TYPE: ICD-10-CM

## 2022-02-01 DIAGNOSIS — M79.674 PAIN OF TOE OF RIGHT FOOT: ICD-10-CM

## 2022-02-01 DIAGNOSIS — I10 PRIMARY HYPERTENSION: ICD-10-CM

## 2022-02-01 DIAGNOSIS — I10 ESSENTIAL HYPERTENSION: ICD-10-CM

## 2022-02-01 PROCEDURE — 99213 OFFICE O/P EST LOW 20 MIN: CPT | Performed by: NURSE PRACTITIONER

## 2022-02-01 RX ORDER — AMLODIPINE BESYLATE 5 MG/1
5 TABLET ORAL
Qty: 90 TABLET | Refills: 1 | Status: SHIPPED | OUTPATIENT
Start: 2022-02-01 | End: 2022-08-29

## 2022-02-01 RX ORDER — LISINOPRIL 20 MG/1
20 TABLET ORAL
Qty: 90 TABLET | Refills: 1 | Status: SHIPPED | OUTPATIENT
Start: 2022-02-01 | End: 2022-08-29

## 2022-02-01 RX ORDER — ATORVASTATIN CALCIUM 40 MG/1
40 TABLET, FILM COATED ORAL
Qty: 90 TABLET | Refills: 1 | Status: SHIPPED | OUTPATIENT
Start: 2022-02-01 | End: 2023-02-16

## 2022-02-01 ASSESSMENT — PATIENT HEALTH QUESTIONNAIRE - PHQ9: CLINICAL INTERPRETATION OF PHQ2 SCORE: 0

## 2022-02-01 NOTE — ASSESSMENT & PLAN NOTE
Chronic health problem, managed with lifestyle measures.  A1c has improved to 6.1%.  Patient has been working on lifestyle measures.  Has lost almost 20 pounds, eating low-carb diet.  Does not monitor blood sugar at home.  Up-to-date on retinal scan.  ACE and statin.  Denies polydipsia, polyuria, vision changes.    Component      Latest Ref Rng & Units 8/30/2021 1/18/2022           5:43 AM  1:28 PM   Glycohemoglobin      4.0 - 5.6 % 6.7 (H) 6.1 (H)

## 2022-02-01 NOTE — ASSESSMENT & PLAN NOTE
Patient reports intermittent right big toe pain.  Burning in quality.  Usually occurs during karate where he is barefoot.  Will last little while after karate, then resolves.  Has not tried anything to help.  Not that bothersome.  He wonders if he has gout.  Denies erythema, swelling, numbness.

## 2022-02-01 NOTE — PROGRESS NOTES
CC: Lab review, refills    HISTORY OF THE PRESENT ILLNESS: Patient is a 61 y.o. male. This pleasant patient is here today for evaluation and management of the following health problems.    Health Maintenance: Patient declined flu vaccine, though we reviewed benefits of flu vaccine.  Encouraged him to wash his hands frequently and stay out of environments with people who are ill.        Type 2 diabetes mellitus  Chronic health problem, managed with lifestyle measures.  A1c has improved to 6.1%.  Patient has been working on lifestyle measures.  Has lost almost 20 pounds, eating low-carb diet.  Does not monitor blood sugar at home.  Up-to-date on retinal scan.  ACE and statin.  Denies polydipsia, polyuria, vision changes.    Component      Latest Ref Rng & Units 8/30/2021 1/18/2022           5:43 AM  1:28 PM   Glycohemoglobin      4.0 - 5.6 % 6.7 (H) 6.1 (H)       Pain of toe of right foot  Patient reports intermittent right big toe pain.  Burning in quality.  Usually occurs during karate where he is barefoot.  Will last little while after karate, then resolves.  Has not tried anything to help.  Not that bothersome.  He wonders if he has gout.  Denies erythema, swelling, numbness.      Allergies: Patient has no known allergies.    Current Outpatient Medications Ordered in Epic   Medication Sig Dispense Refill   • lisinopril (PRINIVIL) 20 MG Tab Take 1 Tablet by mouth every day. 90 Tablet 1   • amLODIPine (NORVASC) 5 MG Tab Take 1 Tablet by mouth every day. 90 Tablet 1   • atorvastatin (LIPITOR) 40 MG Tab Take 1 Tablet by mouth at bedtime. 90 Tablet 1   • Cholecalciferol (VITAMIN D3 PO) Take  by mouth. 650% of daily allowance     • Calcium Carbonate (SUPER CALCIUM PO) Take  by mouth.     • POTASSIUM PO Take  by mouth.     • Multiple Vitamins-Minerals (CENTRUM SILVER 50+MEN PO) Take  by mouth.     • GLUCOSAMINE SULFATE PO Take  by mouth.     • aspirin EC (ECOTRIN) 325 MG Tablet Delayed Response TAKE 1 TABLET BY MOUTH  "EVERY DAY 90 Tab 1     No current Epic-ordered facility-administered medications on file.       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 1/9/2015       Past Surgical History:   Procedure Laterality Date   • CATARACT EXTRACTION WITH IOL Bilateral 2018   • SHOULDER ARTHROSCOPY  5/20/2010    [facility]   • DEBRIDEMENT  5/20/2010    [facility]   • ROTATOR CUFF REPAIR  5/20/2010    [facility]       Social History     Tobacco Use   • Smoking status: Passive Smoke Exposure - Never Smoker   • Smokeless tobacco: Never Used   Vaping Use   • Vaping Use: Never used   Substance Use Topics   • Alcohol use: Yes     Alcohol/week: 0.6 oz     Types: 1 Glasses of wine per week     Comment: seldom   • Drug use: No       Family History   Problem Relation Age of Onset   • Heart Disease Mother    • Cancer Father    • Heart Disease Maternal Aunt    • Heart Disease Maternal Uncle    • Stroke Brother    • Sleep Apnea Neg Hx        ROS:   As in HPI, otherwise negative for chest pain, dyspnea, abdominal pain, dysuria, blood in stool, fever           Exam: /72 (BP Location: Left arm, Patient Position: Sitting, BP Cuff Size: Adult)   Pulse 68   Temp 36.6 °C (97.9 °F) (Temporal)   Resp 16   Ht 1.638 m (5' 4.5\")   Wt 95.4 kg (210 lb 4.8 oz)   SpO2 95%  Body mass index is 35.54 kg/m².    General: Alert, pleasant, well nourished, well developed male in NAD  Neck: Supple without bruit. Thyroid is not enlarged.  Pulmonary: Clear to ausculation.  Normal effort. No rales, ronchi, or wheezing.  Cardiovascular: Normal rate and rhythm without murmur.   Neurologic: " Grossly nonfocal  Lymph: No cervical or supraclavicular lymph nodes are palpable  Skin: Warm and dry.    Musculoskeletal: Normal gait.   Psych: Normal mood and affect. Alert and oriented. Judgment and insight is normal.    Please note that this dictation was created using voice recognition software. I have made every reasonable attempt to correct obvious errors, but I expect that there are errors of grammar and possibly content that I did not discover before finalizing the note.      Assessment/Plan  1. Primary hypertension      2. Essential hypertension  Patient requesting refill today.  - lisinopril (PRINIVIL) 20 MG Tab; Take 1 Tablet by mouth every day.  Dispense: 90 Tablet; Refill: 1  - amLODIPine (NORVASC) 5 MG Tab; Take 1 Tablet by mouth every day.  Dispense: 90 Tablet; Refill: 1  - Comp Metabolic Panel; Future  - ESTIMATED GFR; Future  - CBC WITHOUT DIFFERENTIAL; Future    3. Hyperlipidemia, unspecified hyperlipidemia type  Requesting refill today.  - atorvastatin (LIPITOR) 40 MG Tab; Take 1 Tablet by mouth at bedtime.  Dispense: 90 Tablet; Refill: 1  - Lipid Profile; Future    4. Type 2 diabetes mellitus without complication, without long-term current use of insulin (HCC)  Improved control with lifestyle measures, continue.  Congratulated patient on weight loss.  Encouraged to increase routine aerobic exercise.  Continue to monitor.  - Comp Metabolic Panel; Future  - ESTIMATED GFR; Future  - HEMOGLOBIN A1C; Future    5. Pain of toe of right foot  Explained to patient that this is likely more from arthritis rather than gout as the symptoms only occurred during karate and did not last.  I advised on ice prior and after karate, ibuprofen as needed.  If symptoms worsen, consider x-ray and/or referral to podiatry.    Patient will return to clinic in 6 months or sooner if needed.

## 2022-07-16 LAB
ALBUMIN SERPL-MCNC: 4.8 G/DL (ref 3.8–4.9)
ALBUMIN/GLOB SERPL: 2.4 {RATIO} (ref 1.2–2.2)
ALP SERPL-CCNC: 63 IU/L (ref 44–121)
ALT SERPL-CCNC: 15 IU/L (ref 0–44)
AST SERPL-CCNC: 20 IU/L (ref 0–40)
BILIRUB SERPL-MCNC: 0.5 MG/DL (ref 0–1.2)
BUN SERPL-MCNC: 14 MG/DL (ref 8–27)
BUN/CREAT SERPL: 19 (ref 10–24)
CALCIUM SERPL-MCNC: 9.6 MG/DL (ref 8.6–10.2)
CHLORIDE SERPL-SCNC: 103 MMOL/L (ref 96–106)
CHOLEST SERPL-MCNC: 175 MG/DL (ref 100–199)
CO2 SERPL-SCNC: 25 MMOL/L (ref 20–29)
CREAT SERPL-MCNC: 0.73 MG/DL (ref 0.76–1.27)
EGFRCR SERPLBLD CKD-EPI 2021: 104 ML/MIN/1.73
ERYTHROCYTE [DISTWIDTH] IN BLOOD BY AUTOMATED COUNT: 13 % (ref 11.6–15.4)
GLOBULIN SER CALC-MCNC: 2 G/DL (ref 1.5–4.5)
GLUCOSE SERPL-MCNC: 92 MG/DL (ref 65–99)
HBA1C MFR BLD: 6.1 % (ref 4.8–5.6)
HCT VFR BLD AUTO: 41.4 % (ref 37.5–51)
HDLC SERPL-MCNC: 53 MG/DL
HGB BLD-MCNC: 14.1 G/DL (ref 13–17.7)
LABORATORY COMMENT REPORT: ABNORMAL
LDLC SERPL CALC-MCNC: 108 MG/DL (ref 0–99)
MCH RBC QN AUTO: 29.5 PG (ref 26.6–33)
MCHC RBC AUTO-ENTMCNC: 34.1 G/DL (ref 31.5–35.7)
MCV RBC AUTO: 87 FL (ref 79–97)
NRBC BLD AUTO-RTO: NORMAL %
PLATELET # BLD AUTO: 268 X10E3/UL (ref 150–450)
POTASSIUM SERPL-SCNC: 4.3 MMOL/L (ref 3.5–5.2)
PROT SERPL-MCNC: 6.8 G/DL (ref 6–8.5)
RBC # BLD AUTO: 4.78 X10E6/UL (ref 4.14–5.8)
SODIUM SERPL-SCNC: 140 MMOL/L (ref 134–144)
TRIGL SERPL-MCNC: 74 MG/DL (ref 0–149)
VLDLC SERPL CALC-MCNC: 14 MG/DL (ref 5–40)
WBC # BLD AUTO: 5.5 X10E3/UL (ref 3.4–10.8)

## 2022-08-03 ENCOUNTER — OFFICE VISIT (OUTPATIENT)
Dept: MEDICAL GROUP | Facility: PHYSICIAN GROUP | Age: 62
End: 2022-08-03
Payer: COMMERCIAL

## 2022-08-03 VITALS
WEIGHT: 217.1 LBS | TEMPERATURE: 97 F | BODY MASS INDEX: 36.17 KG/M2 | HEIGHT: 65 IN | OXYGEN SATURATION: 94 % | RESPIRATION RATE: 18 BRPM | DIASTOLIC BLOOD PRESSURE: 78 MMHG | SYSTOLIC BLOOD PRESSURE: 136 MMHG | HEART RATE: 80 BPM

## 2022-08-03 DIAGNOSIS — E66.9 OBESITY (BMI 30-39.9): ICD-10-CM

## 2022-08-03 DIAGNOSIS — Z12.12 SCREENING FOR COLORECTAL CANCER: ICD-10-CM

## 2022-08-03 DIAGNOSIS — E11.9 TYPE 2 DIABETES MELLITUS WITHOUT COMPLICATION, WITHOUT LONG-TERM CURRENT USE OF INSULIN (HCC): ICD-10-CM

## 2022-08-03 DIAGNOSIS — M25.552 LEFT HIP PAIN: ICD-10-CM

## 2022-08-03 DIAGNOSIS — Z12.11 SCREENING FOR COLORECTAL CANCER: ICD-10-CM

## 2022-08-03 DIAGNOSIS — G45.9 TIA (TRANSIENT ISCHEMIC ATTACK): ICD-10-CM

## 2022-08-03 DIAGNOSIS — I10 PRIMARY HYPERTENSION: ICD-10-CM

## 2022-08-03 PROCEDURE — 99213 OFFICE O/P EST LOW 20 MIN: CPT | Performed by: NURSE PRACTITIONER

## 2022-08-03 ASSESSMENT — FIBROSIS 4 INDEX: FIB4 SCORE: 1.16

## 2022-08-03 NOTE — PROGRESS NOTES
CC: Lab review, diabetes    HISTORY OF THE PRESENT ILLNESS: Patient is a 62 y.o. male. This pleasant patient is here today for evaluation and management of the following health problems.    Health Maintenance: due      Type 2 diabetes mellitus (HCC)  This is a chronic health problem that is well controlled with current lifestyle measures.  Hemoglobin A1c is 6.1%.  Continues to work on diabetic diet.  Has tapered off of exercise during the hot months.  Does not monitor blood sugar at home.  Up-to-date on retinal scan.  On ACE and statin.  Denies polydipsia, polyuria, vision changes.      TIA (transient ischemic attack)  History TIA several years ago.  Taking atorvastatin daily aspirin.    Left hip pain  Chronic health problem.  Having intermittent left hip pain.  Some days needing to take NSAID.  Uses pillows between legs during the night with some relief.  Has not used ice or Voltaren gel.  Has not had x-ray.  Siblings have hip arthritis and have had hip replacements.      Allergies: Patient has no known allergies.    Current Outpatient Medications Ordered in Epic   Medication Sig Dispense Refill   • lisinopril (PRINIVIL) 20 MG Tab Take 1 Tablet by mouth every day. 90 Tablet 1   • amLODIPine (NORVASC) 5 MG Tab Take 1 Tablet by mouth every day. 90 Tablet 1   • atorvastatin (LIPITOR) 40 MG Tab Take 1 Tablet by mouth at bedtime. 90 Tablet 1   • Cholecalciferol (VITAMIN D3 PO) Take  by mouth. 650% of daily allowance     • POTASSIUM PO Take  by mouth.     • Multiple Vitamins-Minerals (CENTRUM SILVER 50+MEN PO) Take  by mouth.     • GLUCOSAMINE SULFATE PO Take  by mouth.     • aspirin EC (ECOTRIN) 325 MG Tablet Delayed Response TAKE 1 TABLET BY MOUTH EVERY DAY 90 Tab 1     No current Epic-ordered facility-administered medications on file.       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 1/9/2015       Past Surgical History:   Procedure Laterality Date   • CATARACT EXTRACTION WITH IOL Bilateral 2018   • SHOULDER ARTHROSCOPY  5/20/2010    Performed by XIMENA KANG at SURGERY SAME DAY [facility]   • DEBRIDEMENT  5/20/2010    Performed by XIMENA KANG at SURGERY SAME DAY [facility]   • ROTATOR CUFF REPAIR  5/20/2010    Performed by XIMENA KANG at SURGERY SAME DAY [facility]       Social History     Tobacco Use   • Smoking status: Passive Smoke Exposure - Never Smoker   • Smokeless tobacco: Never Used   Vaping Use   • Vaping Use: Never used   Substance Use Topics   • Alcohol use: Yes     Alcohol/week: 0.6 oz     Types: 1 Glasses of wine per week     Comment: seldom   • Drug use: No       Family History   Problem Relation Age of Onset   • Heart Disease Mother    • Cancer Father    • Heart Disease Maternal Aunt    • Heart Disease Maternal Uncle    • Stroke Brother    • Sleep Apnea Neg Hx        ROS:   As in HPI, otherwise negative for chest pain, dyspnea, abdominal pain, dysuria, blood in stool, fever           Exam: /78   Pulse 80   Temp 36.1 °C (97 °F) (Temporal)   Resp 18   Ht 1.638 m (5' 4.5\")   Wt 98.5 kg (217 lb 1.6 oz)   SpO2 94%  Body mass index is 36.69 kg/m².    General: Alert, pleasant, well nourished, well developed male in NAD  HEENT: Normocephalic. Eyes conjunctiva clear lids without ptosis, pupils equal and reactive to light, ears normal shape and contour, canals are clear bilaterally, tympanic membranes are pearly gray with good light reflex, nasal mucosa without erythema and drainage, oropharynx is without erythema, edema or exudates.   Neck: Supple without bruit. Thyroid is not enlarged.  Pulmonary: Clear to ausculation.  Normal effort. No rales, ronchi, or wheezing.  Cardiovascular: Normal rate and rhythm without murmur. Carotid and radial pulses are intact and equal " bilaterally.  No lower extremity edema.  Abdomen: Soft, nontender, nondistended. Normal bowel sounds. Liver and spleen are not palpable  Neurologic: Grossly nonfocal  Lymph: No cervical or supraclavicular lymph nodes are palpable  Skin: Warm and dry.    Musculoskeletal: Normal gait.   Psych: Normal mood and affect. Alert and oriented. Judgment and insight is normal.    Component      Latest Ref Rng & Units 7/15/2022   Glucose      65 - 99 mg/dL 92   Bun      8 - 27 mg/dL 14   Creatinine      0.76 - 1.27 mg/dL 0.73 (L)   eGFR      >59 mL/min/1.73 104   Bun-Creatinine Ratio      10 - 24 19   Sodium      134 - 144 mmol/L 140   Potassium      3.5 - 5.2 mmol/L 4.3   Chloride      96 - 106 mmol/L 103   Co2      20 - 29 mmol/L 25   Calcium      8.6 - 10.2 mg/dL 9.6   Total Protein      6.0 - 8.5 g/dL 6.8   Albumin      3.8 - 4.9 g/dL 4.8   Globulin      1.5 - 4.5 g/dL 2.0   A-G Ratio      1.2 - 2.2 2.4 (H)   Total Bilirubin      0.0 - 1.2 mg/dL 0.5   Alkaline Phosphatase      44 - 121 IU/L 63   AST(SGOT)      0 - 40 IU/L 20   ALT(SGPT)      0 - 44 IU/L 15   WBC      3.4 - 10.8 x10E3/uL 5.5   RBC      4.14 - 5.80 x10E6/uL 4.78   Hemoglobin      13.0 - 17.7 g/dL 14.1   Hematocrit      37.5 - 51.0 % 41.4   MCV      79 - 97 fL 87   MCH      26.6 - 33.0 pg 29.5   MCHC      31.5 - 35.7 g/dL 34.1   RDW      11.6 - 15.4 % 13.0   Platelet Count      150 - 450 x10E3/uL 268   Nucleated RBC       CANCELED   Cholesterol,Tot      100 - 199 mg/dL 175   Triglycerides      0 - 149 mg/dL 74   HDL      >39 mg/dL 53   VLDL Cholesterol Calc      5 - 40 mg/dL 14   LDL Chol Calc (NIH)      0 - 99 mg/dL 108 (H)   Comment:       CANCELED   Glycohemoglobin      4.8 - 5.6 % 6.1 (H)       Please note that this dictation was created using voice recognition software. I have made every reasonable attempt to correct obvious errors, but I expect that there are errors of grammar and possibly content that I did not discover before finalizing the  note.      Assessment/Plan  1. Screening for colorectal cancer  Reviewed options for colon cancer screening.  - COLOGUARD (FIT DNA)    2. Primary hypertension  Well-controlled.  Continue with amlodipine and lisinopril, no changes.  Advised on lifestyle measures.  Patient plans on getting back and exercise with karate soon.  - Comp Metabolic Panel; Future  - ESTIMATED GFR; Future  - Lipid Profile; Future    3. Type 2 diabetes mellitus without complication, without long-term current use of insulin (HCC)  Well-controlled.  Continue with lifestyle measures.  Continue to work on weight loss and exercise.  - Comp Metabolic Panel; Future  - ESTIMATED GFR; Future  - Lipid Profile; Future  - HEMOGLOBIN A1C; Future  - MICROALBUMIN CREAT RATIO URINE; Future  - TSH WITH REFLEX TO FT4; Future    4. TIA (transient ischemic attack)  Continue with atorvastatin, no changes.    5. Left hip pain  Advised on ice to the area 2-3 times a day for couple weeks.  Also advised on Voltaren gel.  Patient declining further work-up at this time.  Consider x-ray and physical therapy.    6. Obesity (BMI 30-39.9)    - Patient identified as having weight management issue.  Appropriate orders and counseling given.    Patient will return to clinic in 6 months or sooner if needed.

## 2022-08-03 NOTE — ASSESSMENT & PLAN NOTE
Chronic health problem.  Having intermittent left hip pain.  Some days needing to take NSAID.  Uses pillows between legs during the night with some relief.  Has not used ice or Voltaren gel.  Has not had x-ray.  Siblings have hip arthritis and have had hip replacements.

## 2022-08-03 NOTE — ASSESSMENT & PLAN NOTE
This is a chronic health problem that is well controlled with current lifestyle measures.  Hemoglobin A1c is 6.1%.  Continues to work on diabetic diet.  Has tapered off of exercise during the hot months.  Does not monitor blood sugar at home.  Up-to-date on retinal scan.  On ACE and statin.  Denies polydipsia, polyuria, vision changes.

## 2022-08-28 DIAGNOSIS — I10 ESSENTIAL HYPERTENSION: ICD-10-CM

## 2022-08-29 RX ORDER — AMLODIPINE BESYLATE 5 MG/1
5 TABLET ORAL
Qty: 90 TABLET | Refills: 3 | Status: SHIPPED | OUTPATIENT
Start: 2022-08-29 | End: 2023-08-03 | Stop reason: SDUPTHER

## 2022-08-29 RX ORDER — LISINOPRIL 20 MG/1
20 TABLET ORAL
Qty: 90 TABLET | Refills: 3 | Status: SHIPPED | OUTPATIENT
Start: 2022-08-29 | End: 2023-09-28 | Stop reason: SDUPTHER

## 2022-08-29 NOTE — TELEPHONE ENCOUNTER
Received request via: Pharmacy    Was the patient seen in the last year in this department? Yes    Does the patient have an active prescription (recently filled or refills available) for medication(s) requested? No      Last office Visit:08/03/2022  Last Labs:07/14/2022

## 2023-01-06 ENCOUNTER — HOSPITAL ENCOUNTER (OUTPATIENT)
Dept: LAB | Facility: MEDICAL CENTER | Age: 63
End: 2023-01-06
Attending: NURSE PRACTITIONER
Payer: COMMERCIAL

## 2023-01-06 DIAGNOSIS — E11.9 TYPE 2 DIABETES MELLITUS WITHOUT COMPLICATION, WITHOUT LONG-TERM CURRENT USE OF INSULIN (HCC): ICD-10-CM

## 2023-01-06 DIAGNOSIS — I10 PRIMARY HYPERTENSION: ICD-10-CM

## 2023-01-06 LAB
ALBUMIN SERPL BCP-MCNC: 4.9 G/DL (ref 3.2–4.9)
ALBUMIN/GLOB SERPL: 2 G/DL
ALP SERPL-CCNC: 69 U/L (ref 30–99)
ALT SERPL-CCNC: 25 U/L (ref 2–50)
ANION GAP SERPL CALC-SCNC: 9 MMOL/L (ref 7–16)
AST SERPL-CCNC: 22 U/L (ref 12–45)
BILIRUB SERPL-MCNC: 0.5 MG/DL (ref 0.1–1.5)
BUN SERPL-MCNC: 17 MG/DL (ref 8–22)
CALCIUM ALBUM COR SERPL-MCNC: 8.9 MG/DL (ref 8.5–10.5)
CALCIUM SERPL-MCNC: 9.6 MG/DL (ref 8.5–10.5)
CHLORIDE SERPL-SCNC: 102 MMOL/L (ref 96–112)
CHOLEST SERPL-MCNC: 156 MG/DL (ref 100–199)
CO2 SERPL-SCNC: 26 MMOL/L (ref 20–33)
CREAT SERPL-MCNC: 0.82 MG/DL (ref 0.5–1.4)
CREAT UR-MCNC: 184.51 MG/DL
EST. AVERAGE GLUCOSE BLD GHB EST-MCNC: 128 MG/DL
FASTING STATUS PATIENT QL REPORTED: NORMAL
GFR SERPLBLD CREATININE-BSD FMLA CKD-EPI: 100 ML/MIN/1.73 M 2
GLOBULIN SER CALC-MCNC: 2.5 G/DL (ref 1.9–3.5)
GLUCOSE SERPL-MCNC: 124 MG/DL (ref 65–99)
HBA1C MFR BLD: 6.1 % (ref 4–5.6)
HDLC SERPL-MCNC: 56 MG/DL
LDLC SERPL CALC-MCNC: 87 MG/DL
MICROALBUMIN UR-MCNC: <1.2 MG/DL
MICROALBUMIN/CREAT UR: NORMAL MG/G (ref 0–30)
POTASSIUM SERPL-SCNC: 4.2 MMOL/L (ref 3.6–5.5)
PROT SERPL-MCNC: 7.4 G/DL (ref 6–8.2)
SODIUM SERPL-SCNC: 137 MMOL/L (ref 135–145)
TRIGL SERPL-MCNC: 65 MG/DL (ref 0–149)
TSH SERPL DL<=0.005 MIU/L-ACNC: 2.25 UIU/ML (ref 0.38–5.33)

## 2023-01-06 PROCEDURE — 82043 UR ALBUMIN QUANTITATIVE: CPT

## 2023-01-06 PROCEDURE — 36415 COLL VENOUS BLD VENIPUNCTURE: CPT

## 2023-01-06 PROCEDURE — 83036 HEMOGLOBIN GLYCOSYLATED A1C: CPT

## 2023-01-06 PROCEDURE — 80053 COMPREHEN METABOLIC PANEL: CPT

## 2023-01-06 PROCEDURE — 82570 ASSAY OF URINE CREATININE: CPT

## 2023-01-06 PROCEDURE — 80061 LIPID PANEL: CPT

## 2023-01-06 PROCEDURE — 84443 ASSAY THYROID STIM HORMONE: CPT

## 2023-02-03 ENCOUNTER — OFFICE VISIT (OUTPATIENT)
Dept: MEDICAL GROUP | Facility: PHYSICIAN GROUP | Age: 63
End: 2023-02-03
Payer: COMMERCIAL

## 2023-02-03 VITALS
TEMPERATURE: 97.9 F | OXYGEN SATURATION: 95 % | RESPIRATION RATE: 18 BRPM | DIASTOLIC BLOOD PRESSURE: 70 MMHG | WEIGHT: 219.7 LBS | SYSTOLIC BLOOD PRESSURE: 110 MMHG | HEART RATE: 71 BPM | HEIGHT: 65 IN | BODY MASS INDEX: 36.6 KG/M2

## 2023-02-03 DIAGNOSIS — E11.9 TYPE 2 DIABETES MELLITUS WITHOUT COMPLICATION, WITHOUT LONG-TERM CURRENT USE OF INSULIN (HCC): ICD-10-CM

## 2023-02-03 DIAGNOSIS — M25.571 ACUTE RIGHT ANKLE PAIN: ICD-10-CM

## 2023-02-03 DIAGNOSIS — I10 ESSENTIAL HYPERTENSION: ICD-10-CM

## 2023-02-03 PROCEDURE — 99214 OFFICE O/P EST MOD 30 MIN: CPT | Performed by: NURSE PRACTITIONER

## 2023-02-03 ASSESSMENT — PATIENT HEALTH QUESTIONNAIRE - PHQ9: CLINICAL INTERPRETATION OF PHQ2 SCORE: 0

## 2023-02-03 ASSESSMENT — FIBROSIS 4 INDEX: FIB4 SCORE: .985074626865671642

## 2023-02-03 NOTE — NON-PROVIDER
CC:  lab review/right ankle pain    HISTORY OF THE PRESENT ILLNESS: Patient is a 62 y.o. male. This pleasant patient is here today for evaluation and management of the following health problems.    Right ankle pain  Pt reports right medial ankle/foot swelling and pain over lower malleolar aspect of right ankle after 3 month old injury. Injury occurred with impact between right ankle and opponents knee during karate kick during practice. Pt reports initial large amount of bruising upon injury to medial ankle with tenderness over area, pt was able to walk on ankle immediately after injury. Pt states intermittent pain to area, but is able to ambulate without complaint. Area remains painful with impact during karate exercise. Pain is noted more when area is bumped or with extension of ankle. Pt has noted increased would healing, denies numbness to tingling. Plan: refer to orthopedics for further evaluation of right ankle pain and swelling, 3 months post injury.     HTN  This is a chronic health problem that is well controlled with current medications and lifestyle measures. Continues karate 3 times a week for exercise, wants to start riding bike when snow melts. Denies chest pain, shortness of breath, headache, or vision changes. Plan: continue taking lisinopril 20 mg daily and amlodipine 5 mg daily.    Type 2 diabetes mellitus  This is a chronic health problem that is well controlled with current lifestyle measures. Hemoglobin A1c remains 6.1%.  Continues to work on diabetic diet. Continues doing karate, discussed adding more aerobic exercise.   Does not monitor blood sugar at home. Last retinal scan with [facility]. On ACE and statin. Denies polydipsia and vision changes. Plan continue to monitor at 6 month follow up.    No problem-specific Assessment & Plan notes found for this encounter.      Allergies: Patient has no known allergies.    Current Outpatient Medications Ordered in Epic   Medication Sig  Dispense Refill    amLODIPine (NORVASC) 5 MG Tab TAKE 1 TABLET BY MOUTH EVERY DAY 90 Tablet 3    lisinopril (PRINIVIL) 20 MG Tab TAKE 1 TABLET BY MOUTH EVERY DAY 90 Tablet 3    atorvastatin (LIPITOR) 40 MG Tab Take 1 Tablet by mouth at bedtime. 90 Tablet 1    Cholecalciferol (VITAMIN D3 PO) Take  by mouth. 650% of daily allowance      POTASSIUM PO Take  by mouth. With calcium and zinc      Multiple Vitamins-Minerals (CENTRUM SILVER 50+MEN PO) Take  by mouth.      GLUCOSAMINE SULFATE PO Take  by mouth.      aspirin EC (ECOTRIN) 325 MG Tablet Delayed Response TAKE 1 TABLET BY MOUTH EVERY DAY 90 Tab 1     No current Epic-ordered facility-administered medications on file.       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       Past Surgical History:   Procedure Laterality Date    CATARACT EXTRACTION WITH IOL Bilateral 2018    SHOULDER ARTHROSCOPY  5/20/2010    [facility]    DEBRIDEMENT  5/20/2010    [facility]    ROTATOR CUFF REPAIR  5/20/2010    [facility]       Social History     Tobacco Use    Smoking status: Passive Smoke Exposure - Never Smoker    Smokeless tobacco: Never   Vaping Use    Vaping Use: Never used   Substance Use Topics    Alcohol use: Yes     Alcohol/week: 0.6 oz     Types: 1 Glasses of wine per week     Comment: seldom    Drug use: No       Family History   Problem Relation Age of Onset    Heart Disease Mother     Cancer Father     Heart Disease Maternal Aunt     Heart Disease Maternal Uncle     Stroke Brother     Sleep Apnea Neg Hx        ROS: see HPI         Exam: /70   Pulse 71   Temp 36.6 °C (97.9 °F) (Temporal)   " Resp 18   Ht 1.638 m (5' 4.5\")   Wt 99.7 kg (219 lb 11.2 oz)   SpO2 95%  Body mass index is 37.13 kg/m².    General: Well nourished, well developed male in NAD  HEENT: Normocephalic. Eyes conjunctiva clear lids without ptosis  Neck: Supple without bruit. Thyroid is not enlarged.  Pulmonary: Clear to ausculation.  Normal effort. No rales, ronchi, or wheezing.  Cardiovascular: Normal rate and rhythm without murmur. Minimal right ankle swelling, left pedal pulse +2, right pedal pulse unable to palpate, posterior tibular pulses equal bilaterally  Neurologic: Grossly nonfocal  Lymph: No cervical or supraclavicular lymph nodes are palpable  Skin: Warm and dry.  No obvious lesions.  Musculoskeletal: Normal gait. Full active ROM of right ankle. Bilateral feet warm to touch  Psych: Normal mood and affect. Alert and oriented. Judgment and insight is normal.    Monofilament testing with a 10 gram force: sensation: intact bilaterally  Visual Inspection: Feet without maceration, ulcers, or fissures.  Pedal pulses: decreased on right     Assessment/Plan  1. Acute right ankle pain    - Referral to Orthopedics    2. Type 2 diabetes mellitus without complication, without long-term current use of insulin ([facility])    - Diabetic Monofilament Lower Extremity Exam  - Comp Metabolic Panel; Future  - ESTIMATED GFR; Future  - HEMOGLOBIN A1C; Future     "

## 2023-02-03 NOTE — PROGRESS NOTES
CC:  lab review, ankle pain    HISTORY OF THE PRESENT ILLNESS: Patient is a 62 y.o. male. This pleasant patient is here today for evaluation and management of the following health problems.    Health Maintenance: Patient declined flu vaccine, though we reviewed benefits of flu vaccine.  Encouraged him to wash his hands frequently and stay out of environments with people who are ill.      Acute right ankle pain  Pt reports right medial ankle/foot swelling and pain over lower malleolar aspect of right ankle after 3 month old injury. Injury occurred with impact between right ankle and opponents knee during karate kick during practice. Pt reports initial large amount of bruising upon injury to medial ankle with tenderness over area, pt was able to walk on ankle immediately after injury. Pt states intermittent pain to area, but is able to ambulate without complaint. Area remains painful with impact during karate exercise. Pain is noted more when area is bumped or with extension of ankle. Pt has noted increased would healing, denies numbness to tingling.    HTN (hypertension)  This is a chronic health problem that is well controlled with current medications and lifestyle measures. Continues karate 3 times a week for exercise, wants to start riding bike when snow melts. Denies chest pain, shortness of breath, headache, or vision changes. Plan: continue taking lisinopril 20 mg daily and amlodipine 5 mg daily.    Type 2 diabetes mellitus (HCC)  This is a chronic health problem that is well controlled with current lifestyle measures. Hemoglobin A1c remains 6.1%.  Continues to work on diabetic diet. Continues doing karate, discussed adding more aerobic exercise.   Does not monitor blood sugar at home. Last retinal scan with [facility]. On ACE and statin. Denies polydipsia and vision changes.     Allergies: Patient has no known allergies.    Current Outpatient Medications Ordered in Epic   Medication Sig Dispense  Refill    amLODIPine (NORVASC) 5 MG Tab TAKE 1 TABLET BY MOUTH EVERY DAY 90 Tablet 3    lisinopril (PRINIVIL) 20 MG Tab TAKE 1 TABLET BY MOUTH EVERY DAY 90 Tablet 3    atorvastatin (LIPITOR) 40 MG Tab Take 1 Tablet by mouth at bedtime. 90 Tablet 1    Cholecalciferol (VITAMIN D3 PO) Take  by mouth. 650% of daily allowance      POTASSIUM PO Take  by mouth. With calcium and zinc      Multiple Vitamins-Minerals (CENTRUM SILVER 50+MEN PO) Take  by mouth.      GLUCOSAMINE SULFATE PO Take  by mouth.      aspirin EC (ECOTRIN) 325 MG Tablet Delayed Response TAKE 1 TABLET BY MOUTH EVERY DAY 90 Tab 1     No current Epic-ordered facility-administered medications on file.       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 1/9/2015       Past Surgical History:   Procedure Laterality Date    CATARACT EXTRACTION WITH IOL Bilateral 2018    SHOULDER ARTHROSCOPY  5/20/2010    [facility]    DEBRIDEMENT  5/20/2010    Performed by XIMENA KANG at [facility]ERY [facility] DAY [facility]    ROTATOR CUFF REPAIR  5/20/2010    [facility]       Social History     Tobacco Use    Smoking status: Passive Smoke Exposure - Never Smoker    Smokeless tobacco: Never   Vaping Use    Vaping Use: Never used   Substance Use Topics    Alcohol use: Yes     Alcohol/week: 0.6 oz     Types: 1 Glasses of wine per week     Comment: seldom    Drug use: No       Family History   Problem Relation Age of Onset    Heart Disease Mother     Cancer Father     Heart Disease Maternal Aunt     Heart Disease Maternal Uncle     Stroke Brother     Sleep Apnea Neg Hx        ROS: As in HPI, otherwise negative for chest pain, dyspnea, abdominal pain, dysuria, blood in  "stool, fever           Exam: /70   Pulse 71   Temp 36.6 °C (97.9 °F) (Temporal)   Resp 18   Ht 1.638 m (5' 4.5\")   Wt 99.7 kg (219 lb 11.2 oz)   SpO2 95%  Body mass index is 37.13 kg/m².    General: Alert, pleasant, well nourished, well developed male in NAD  HEENT: Normocephalic. Eyes conjunctiva clear lids without ptosis  Neck: Supple without bruit. Thyroid is not enlarged.  Pulmonary: Clear to ausculation.  Normal effort. No rales, ronchi, or wheezing.  Cardiovascular: Normal rate and rhythm without murmur. Minimal right ankle swelling, left pedal pulse +2, right pedal pulse unable to palpate, posterior tibular pulses equal bilaterally  Neurologic: Grossly nonfocal  Lymph: No cervical or supraclavicular lymph nodes are palpable  Skin: Warm and dry.  No obvious lesions.  Musculoskeletal: Normal gait. Full active ROM of right ankle. Bilateral feet warm to touch  Psych: Normal mood and affect. Alert and oriented. Judgment and insight is normal.     Monofilament testing with a 10 gram force: sensation: intact bilaterally  Visual Inspection: Feet without maceration, ulcers, or fissures.  Pedal pulses: decreased on right     Please note that this dictation was created using voice recognition software. I have made every reasonable attempt to correct obvious errors, but I expect that there are errors of grammar and possibly content that I did not discover before finalizing the note.      Assessment/Plan  1. Acute right ankle pain  Reviewed differentials with patient including ligament/tendon strain or tear, fracture.  Will refer to ortho for further evaluation and treatment options  - Referral to Orthopedics    2. Type 2 diabetes mellitus without complication, without long-term current use of insulin (HCC)  Well controlled. Continue with lifestyle measures.  Continue to monitor. If A1C continues to be lower than 6.4%, will change to prediabetes diagnosis.  Will request records for retinal scan  - Diabetic " Monofilament Lower Extremity Exam  - Comp Metabolic Panel; Future  - ESTIMATED GFR; Future  - HEMOGLOBIN A1C; Future    3. Essential hypertension  Well controlled. Continue with lisinopril and amlodipine at current doses.  Continue with lifestyle measures.    Patient will rtc in 6 months or sooner if needed.     Patient seen and evaluated with FNP student, concur with documentation and findings, assessment and plan.    Please see additional documentation/note.

## 2023-02-04 PROBLEM — M25.571 ACUTE RIGHT ANKLE PAIN: Status: ACTIVE | Noted: 2023-02-04

## 2023-02-04 NOTE — ASSESSMENT & PLAN NOTE
This is a chronic health problem that is well controlled with current medications and lifestyle measures. Continues karate 3 times a week for exercise, wants to start riding bike when snow melts. Denies chest pain, shortness of breath, headache, or vision changes. Plan: continue taking lisinopril 20 mg daily and amlodipine 5 mg daily.

## 2023-02-04 NOTE — ASSESSMENT & PLAN NOTE
This is a chronic health problem that is well controlled with current lifestyle measures. Hemoglobin A1c remains 6.1%.  Continues to work on diabetic diet. Continues doing karate, discussed adding more aerobic exercise.   Does not monitor blood sugar at home. Last retinal scan with [facility]. On ACE and statin. Denies polydipsia and vision changes.

## 2023-02-04 NOTE — ASSESSMENT & PLAN NOTE
Pt reports right medial ankle/foot swelling and pain over lower malleolar aspect of right ankle after 3 month old injury. Injury occurred with impact between right ankle and opponents knee during karate kick during practice. Pt reports initial large amount of bruising upon injury to medial ankle with tenderness over area, pt was able to walk on ankle immediately after injury. Pt states intermittent pain to area, but is able to ambulate without complaint. Area remains painful with impact during karate exercise. Pain is noted more when area is bumped or with extension of ankle. Pt has noted increased would healing, denies numbness to tingling.

## 2023-02-16 DIAGNOSIS — E78.5 HYPERLIPIDEMIA, UNSPECIFIED HYPERLIPIDEMIA TYPE: ICD-10-CM

## 2023-02-16 RX ORDER — ATORVASTATIN CALCIUM 40 MG/1
40 TABLET, FILM COATED ORAL
Qty: 90 TABLET | Refills: 3 | Status: SHIPPED | OUTPATIENT
Start: 2023-02-16 | End: 2024-03-12 | Stop reason: SDUPTHER

## 2023-02-17 NOTE — TELEPHONE ENCOUNTER
Last ov 2/3/23    Received request via: Pharmacy    Was the patient seen in the last year in this department? Yes    Does the patient have an active prescription (recently filled or refills available) for medication(s) requested? No    Does the patient have long term Plus and need 100 day supply (blood pressure, diabetes and cholesterol meds only)? Patient does not have SCP

## 2023-02-22 ENCOUNTER — OFFICE VISIT (OUTPATIENT)
Dept: URGENT CARE | Facility: PHYSICIAN GROUP | Age: 63
End: 2023-02-22
Payer: COMMERCIAL

## 2023-02-22 VITALS
OXYGEN SATURATION: 94 % | BODY MASS INDEX: 37.56 KG/M2 | RESPIRATION RATE: 16 BRPM | WEIGHT: 220 LBS | SYSTOLIC BLOOD PRESSURE: 128 MMHG | DIASTOLIC BLOOD PRESSURE: 84 MMHG | HEIGHT: 64 IN | HEART RATE: 89 BPM | TEMPERATURE: 97.7 F

## 2023-02-22 DIAGNOSIS — T78.3XXA ANGIOEDEMA OF TONGUE: ICD-10-CM

## 2023-02-22 PROCEDURE — 99214 OFFICE O/P EST MOD 30 MIN: CPT | Performed by: PHYSICIAN ASSISTANT

## 2023-02-22 RX ORDER — DEXAMETHASONE SODIUM PHOSPHATE 4 MG/ML
8 INJECTION, SOLUTION INTRA-ARTICULAR; INTRALESIONAL; INTRAMUSCULAR; INTRAVENOUS; SOFT TISSUE ONCE
Status: COMPLETED | OUTPATIENT
Start: 2023-02-22 | End: 2023-02-22

## 2023-02-22 RX ORDER — METHYLPREDNISOLONE 4 MG/1
TABLET ORAL
Qty: 21 TABLET | Refills: 0 | Status: SHIPPED | OUTPATIENT
Start: 2023-02-22 | End: 2023-08-03

## 2023-02-22 RX ORDER — FAMOTIDINE 20 MG/1
20 TABLET, FILM COATED ORAL 2 TIMES DAILY
Qty: 28 TABLET | Refills: 0 | Status: SHIPPED | OUTPATIENT
Start: 2023-02-22 | End: 2023-03-08

## 2023-02-22 RX ORDER — EPINEPHRINE 0.3 MG/.3ML
0.3 INJECTION SUBCUTANEOUS ONCE
Qty: 0.3 ML | Refills: 0 | Status: SHIPPED | OUTPATIENT
Start: 2023-02-22 | End: 2023-02-22

## 2023-02-22 RX ORDER — DEXAMETHASONE SODIUM PHOSPHATE 4 MG/ML
10 INJECTION, SOLUTION INTRA-ARTICULAR; INTRALESIONAL; INTRAMUSCULAR; INTRAVENOUS; SOFT TISSUE ONCE
Status: DISCONTINUED | OUTPATIENT
Start: 2023-02-22 | End: 2023-02-22

## 2023-02-22 RX ORDER — DEXAMETHASONE SODIUM PHOSPHATE 10 MG/ML
10 INJECTION INTRAMUSCULAR; INTRAVENOUS ONCE
Status: DISCONTINUED | OUTPATIENT
Start: 2023-02-22 | End: 2023-02-22

## 2023-02-22 RX ADMIN — DEXAMETHASONE SODIUM PHOSPHATE 8 MG: 4 INJECTION, SOLUTION INTRA-ARTICULAR; INTRALESIONAL; INTRAMUSCULAR; INTRAVENOUS; SOFT TISSUE at 11:25

## 2023-02-22 ASSESSMENT — FIBROSIS 4 INDEX: FIB4 SCORE: .985074626865671642

## 2023-02-22 NOTE — PROGRESS NOTES
Subjective:   Estiven Singer is a 62 y.o. male who presents for Oral Swelling (Pt states half tongue is swollen, (R) side, x45 minutes, pt states he has taken benadryl. )      HPI  The patient presents to the Urgent Care with complaints of right-sided tongue swelling onset approximately 1 hour ago.  Mild discomfort to the tongue.  He did take 2 Benadryl which seems to have improved the swelling mildly.  Reports about 1 week ago he had similar left facial swelling which spontaneously resolved with Benadryl followed by right facial swelling 5 days ago which spontaneously resolved with Benadryl.  Reports of some sort of allergic reaction a long long time ago but denies any known allergies.  He brought a new cat in the house in December.  Denies any new medications other than changing the brand of his vitamin D3 about 1.5 weeks ago.  Also reported of right foot swelling to the bottom of his foot that started today that seems to have resolved.  Denies any other symptoms.  Denies any swollen sensation to throat.  Denies any lip swelling.  No rash.  Denies any recent illness, fevers, chills, dizziness, difficulty breathing, wheezing, chest pain, nausea, vomiting.    Medications:    amLODIPine Tabs  aspirin EC Tbec  atorvastatin Tabs  CENTRUM SILVER 50+MEN PO  GLUCOSAMINE SULFATE PO  lisinopril Tabs  POTASSIUM PO  VITAMIN D3 PO    Allergies: Patient has no known allergies.    Problem List: Estiven Singer does not have any pertinent problems on file.    Surgical History:  Past Surgical History:   Procedure Laterality Date    CATARACT EXTRACTION WITH IOL Bilateral 2018    SHOULDER ARTHROSCOPY  5/20/2010    Performed by XIMENA KANG at SURGERY SAME DAY [facility]    DEBRIDEMENT  5/20/2010    Performed by XIMENA KANG at SURGERY SAME DAY [facility]    ROTATOR CUFF REPAIR  5/20/2010    Performed by XIMENA KANG at SURGERY SAME DAY [facility]       Past Social Hx: Estiven Singer  reports that he has never  "smoked. He has been exposed to tobacco smoke. He has never used smokeless tobacco. He reports current alcohol use of about 0.6 oz per week. He reports that he does not use drugs.     Past Family Hx:  Estiven Singer family history includes Cancer in his father; Heart Disease in his maternal aunt, maternal uncle, and mother; Stroke in his brother.     Problem list, medications, and allergies reviewed by myself today in Epic.     Objective:     /84   Pulse 89   Temp 36.5 °C (97.7 °F) (Temporal)   Resp 16   Ht 1.626 m (5' 4\")   Wt 99.8 kg (220 lb)   SpO2 94%   BMI 37.76 kg/m²     Physical Exam  Vitals reviewed.   Constitutional:       General: He is not in acute distress.     Appearance: Normal appearance. He is not ill-appearing or toxic-appearing.   HENT:      Nose: Nose normal.      Mouth/Throat:      Mouth: Angioedema (significant edema to tongue moreso to right side of tongue. no lesions. no erythema or discharge. no abscess. no angioedema to pharynx.) present.      Pharynx: No posterior oropharyngeal erythema.      Tonsils: No tonsillar exudate or tonsillar abscesses.   Eyes:      Conjunctiva/sclera: Conjunctivae normal.      Pupils: Pupils are equal, round, and reactive to light.   Cardiovascular:      Rate and Rhythm: Normal rate and regular rhythm.      Heart sounds: Normal heart sounds.   Pulmonary:      Effort: Pulmonary effort is normal. No respiratory distress.      Breath sounds: Normal breath sounds. No wheezing, rhonchi or rales.   Musculoskeletal:      Cervical back: Neck supple. No rigidity.   Lymphadenopathy:      Cervical: No cervical adenopathy.   Skin:     General: Skin is warm and dry.      Findings: No rash.   Neurological:      General: No focal deficit present.      Mental Status: He is alert and oriented to person, place, and time.   Psychiatric:         Mood and Affect: Mood normal.         Behavior: Behavior normal.       Diagnosis and associated orders:     1. Angioedema of " tongue  - methylPREDNISolone (MEDROL DOSEPAK) 4 MG Tablet Therapy Pack; Follow schedule on package instructions.  Dispense: 21 Tablet; Refill: 0  - famotidine (PEPCID) 20 MG Tab; Take 1 Tablet by mouth 2 times a day for 14 days.  Dispense: 28 Tablet; Refill: 0  - EPINEPHrine (EPIPEN) 0.3 MG/0.3ML Solution Auto-injector solution for injection; Inject 0.3 mL into the shoulder, thigh, or buttocks one time for 1 dose.  Dispense: 0.3 mL; Refill: 0  - dexamethasone (DECADRON) injection 8 mg       Comments/MDM:     This is a pleasant 62-year-old male who presents to the urgent care with swelling of his tongue onset today about an hour ago.  See full history above.  Unknown cause.  He is well-appearing in no acute distress.  No angioedema of the pharynx.  No rash.  Vital signs stable.  Discontinue the new vitamin D brand.  Patient treated with Decadron 8 mg p.o. here in the clinic.  Start Medrol Dosepak tomorrow.  Recommend Benadryl 25 mg every 4-6 hours today and tomorrow.  Start Pepcid.  Will have EpiPen on hand just in case for any symptoms or signs of anaphylaxis as discussed.  Keep a log of different foods, soaps, detergents, lotions, medications to attempt to track down possible allergen.       I personally reviewed prior external notes and test results pertinent to today's visit. Red flags discussed and indications to present to the Emergency Department. Pathogenesis of diagnosis discussed including typical length and natural progression. Supportive care, natural history, differential diagnoses, and indications for immediate follow-up discussed. Patient expresses understanding and agrees to plan. Patient denies any other questions or concerns.     Follow-up with the primary care physician for recheck, reevaluation, and consideration of further management.    Time spent evaluating the patient was 32 minutes which included preparing for the visit, obtaining history, examination, ordering  labs/tests/procedures/medications, independent interpretation, discussion of plan, counseling/education, and documentation into chart.     Please note that this dictation was created using voice recognition software. I have made a reasonable attempt to correct obvious errors, but I expect that there are errors of grammar and possibly content that I did not discover before finalizing the note.    This note was electronically signed by Jay Abdi PA-C

## 2023-07-25 ENCOUNTER — HOSPITAL ENCOUNTER (OUTPATIENT)
Dept: LAB | Facility: MEDICAL CENTER | Age: 63
End: 2023-07-25
Attending: ALLERGY & IMMUNOLOGY
Payer: COMMERCIAL

## 2023-07-25 LAB
BASOPHILS # BLD AUTO: 0.9 % (ref 0–1.8)
BASOPHILS # BLD: 0.06 K/UL (ref 0–0.12)
C4 SERPL-MCNC: 29.3 MG/DL (ref 19–52)
CRP SERPL HS-MCNC: 0.34 MG/DL (ref 0–0.75)
EOSINOPHIL # BLD AUTO: 0.12 K/UL (ref 0–0.51)
EOSINOPHIL NFR BLD: 1.7 % (ref 0–6.9)
ERYTHROCYTE [DISTWIDTH] IN BLOOD BY AUTOMATED COUNT: 42.5 FL (ref 35.9–50)
HCT VFR BLD AUTO: 44.6 % (ref 42–52)
HGB BLD-MCNC: 15.1 G/DL (ref 14–18)
IMM GRANULOCYTES # BLD AUTO: 0.02 K/UL (ref 0–0.11)
IMM GRANULOCYTES NFR BLD AUTO: 0.3 % (ref 0–0.9)
LYMPHOCYTES # BLD AUTO: 1.71 K/UL (ref 1–4.8)
LYMPHOCYTES NFR BLD: 24.6 % (ref 22–41)
MCH RBC QN AUTO: 30 PG (ref 27–33)
MCHC RBC AUTO-ENTMCNC: 33.9 G/DL (ref 32.3–36.5)
MCV RBC AUTO: 88.5 FL (ref 81.4–97.8)
MONOCYTES # BLD AUTO: 0.64 K/UL (ref 0–0.85)
MONOCYTES NFR BLD AUTO: 9.2 % (ref 0–13.4)
NEUTROPHILS # BLD AUTO: 4.41 K/UL (ref 1.82–7.42)
NEUTROPHILS NFR BLD: 63.3 % (ref 44–72)
NRBC # BLD AUTO: 0 K/UL
NRBC BLD-RTO: 0 /100 WBC (ref 0–0.2)
PLATELET # BLD AUTO: 259 K/UL (ref 164–446)
PMV BLD AUTO: 10.2 FL (ref 9–12.9)
RBC # BLD AUTO: 5.04 M/UL (ref 4.7–6.1)
WBC # BLD AUTO: 7 K/UL (ref 4.8–10.8)

## 2023-07-25 PROCEDURE — 86160 COMPLEMENT ANTIGEN: CPT

## 2023-07-25 PROCEDURE — 86038 ANTINUCLEAR ANTIBODIES: CPT

## 2023-07-25 PROCEDURE — 86140 C-REACTIVE PROTEIN: CPT

## 2023-07-25 PROCEDURE — 86161 COMPLEMENT/FUNCTION ACTIVITY: CPT

## 2023-07-25 PROCEDURE — 36415 COLL VENOUS BLD VENIPUNCTURE: CPT

## 2023-07-25 PROCEDURE — 83520 IMMUNOASSAY QUANT NOS NONAB: CPT

## 2023-07-25 PROCEDURE — 85025 COMPLETE CBC W/AUTO DIFF WBC: CPT

## 2023-07-27 LAB — NUCLEAR IGG SER QL IA: NORMAL

## 2023-07-28 LAB
C1INH FUNCTIONAL/C1INH TOTAL MFR SERPL: 101 %
C1INH SERPL-MCNC: 30 MG/DL (ref 21–38)

## 2023-07-29 LAB — TRYPTASE SERPL-MCNC: 9.3 UG/L

## 2023-08-03 ENCOUNTER — HOSPITAL ENCOUNTER (OUTPATIENT)
Dept: LAB | Facility: MEDICAL CENTER | Age: 63
End: 2023-08-03
Attending: NURSE PRACTITIONER
Payer: COMMERCIAL

## 2023-08-03 ENCOUNTER — OFFICE VISIT (OUTPATIENT)
Dept: MEDICAL GROUP | Facility: PHYSICIAN GROUP | Age: 63
End: 2023-08-03
Payer: COMMERCIAL

## 2023-08-03 VITALS
WEIGHT: 219.7 LBS | DIASTOLIC BLOOD PRESSURE: 80 MMHG | TEMPERATURE: 97.6 F | SYSTOLIC BLOOD PRESSURE: 138 MMHG | RESPIRATION RATE: 16 BRPM | BODY MASS INDEX: 37.51 KG/M2 | HEIGHT: 64 IN | OXYGEN SATURATION: 97 % | HEART RATE: 72 BPM

## 2023-08-03 DIAGNOSIS — I10 ESSENTIAL HYPERTENSION: ICD-10-CM

## 2023-08-03 DIAGNOSIS — E11.9 TYPE 2 DIABETES MELLITUS WITHOUT COMPLICATION, WITHOUT LONG-TERM CURRENT USE OF INSULIN (HCC): ICD-10-CM

## 2023-08-03 LAB
ALBUMIN SERPL BCP-MCNC: 4.9 G/DL (ref 3.2–4.9)
ALBUMIN/GLOB SERPL: 2 G/DL
ALP SERPL-CCNC: 63 U/L (ref 30–99)
ALT SERPL-CCNC: 20 U/L (ref 2–50)
ANION GAP SERPL CALC-SCNC: 13 MMOL/L (ref 7–16)
AST SERPL-CCNC: 22 U/L (ref 12–45)
BILIRUB SERPL-MCNC: 0.5 MG/DL (ref 0.1–1.5)
BUN SERPL-MCNC: 17 MG/DL (ref 8–22)
CALCIUM ALBUM COR SERPL-MCNC: 9.1 MG/DL (ref 8.5–10.5)
CALCIUM SERPL-MCNC: 9.8 MG/DL (ref 8.5–10.5)
CHLORIDE SERPL-SCNC: 106 MMOL/L (ref 96–112)
CO2 SERPL-SCNC: 23 MMOL/L (ref 20–33)
CREAT SERPL-MCNC: 0.81 MG/DL (ref 0.5–1.4)
EST. AVERAGE GLUCOSE BLD GHB EST-MCNC: 140 MG/DL
FASTING STATUS PATIENT QL REPORTED: NORMAL
GFR SERPLBLD CREATININE-BSD FMLA CKD-EPI: 100 ML/MIN/1.73 M 2
GLOBULIN SER CALC-MCNC: 2.4 G/DL (ref 1.9–3.5)
GLUCOSE SERPL-MCNC: 130 MG/DL (ref 65–99)
HBA1C MFR BLD: 6.5 % (ref 4–5.6)
POTASSIUM SERPL-SCNC: 4.3 MMOL/L (ref 3.6–5.5)
PROT SERPL-MCNC: 7.3 G/DL (ref 6–8.2)
SODIUM SERPL-SCNC: 142 MMOL/L (ref 135–145)

## 2023-08-03 PROCEDURE — 36415 COLL VENOUS BLD VENIPUNCTURE: CPT

## 2023-08-03 PROCEDURE — 83036 HEMOGLOBIN GLYCOSYLATED A1C: CPT

## 2023-08-03 PROCEDURE — 3075F SYST BP GE 130 - 139MM HG: CPT | Performed by: NURSE PRACTITIONER

## 2023-08-03 PROCEDURE — 99213 OFFICE O/P EST LOW 20 MIN: CPT | Performed by: NURSE PRACTITIONER

## 2023-08-03 PROCEDURE — 80053 COMPREHEN METABOLIC PANEL: CPT

## 2023-08-03 PROCEDURE — 3079F DIAST BP 80-89 MM HG: CPT | Performed by: NURSE PRACTITIONER

## 2023-08-03 RX ORDER — ASPIRIN 81 MG/1
81 TABLET ORAL DAILY
COMMUNITY

## 2023-08-03 RX ORDER — ASCORBATE CALCIUM/BIOFLAVONOID 1000-200MG
TABLET, EXTENDED RELEASE ORAL
COMMUNITY

## 2023-08-03 RX ORDER — AMLODIPINE BESYLATE 10 MG/1
10 TABLET ORAL
Qty: 90 TABLET | Refills: 1 | Status: SHIPPED | OUTPATIENT
Start: 2023-08-03 | End: 2024-03-12 | Stop reason: SDUPTHER

## 2023-08-03 ASSESSMENT — FIBROSIS 4 INDEX: FIB4 SCORE: 1.04

## 2023-08-03 NOTE — ASSESSMENT & PLAN NOTE
Chronic health problem, managed with lifestyle measures.  Due for updated labs, but did not get done prior to today's appointment.  We do not have supplies today for point-of-care testing.  We will get labs drawn on the way out today.  Patient reports he has been working on aerobic exercise and diabetic diet.  Denies polydipsia, polyuria, vision changes.  
This is a chronic health problem that is well controlled with current medications and lifestyle measures.  Taking amlodipine 5 mg daily and lisinopril 20 mg daily.  For the last 6 months has been having allergic reaction with swelling in different areas of the body.  1 time had swelling in the tongue.  Swelling usually relieved with Benadryl, but makes him very tired of course.  Does have EpiPen on hand.  Consulted with allergist who wants patient to stop his lisinopril.  Patient has been taking lisinopril for over 5 years.  Patient wanted to consult with me prior to discontinuing lisinopril.  Not monitoring blood pressure at home.  The patient denies chest pain, shortness of breath, headache, vision changes, epistaxis, or dyspnea on exertion.    
DM

## 2023-08-03 NOTE — PROGRESS NOTES
CC: Hypertension    HISTORY OF THE PRESENT ILLNESS: Patient is a 63 y.o. male. This pleasant patient is here today for evaluation and management of the following health problems.    Patient was to have labs done before today, but he was unable to do so.  He will get labs done on the way out from appointment.      Type 2 diabetes mellitus (HCC)  Chronic health problem, managed with lifestyle measures.  Due for updated labs, but did not get done prior to today's appointment.  We do not have supplies today for point-of-care testing.  We will get labs drawn on the way out today.  Patient reports he has been working on aerobic exercise and diabetic diet.  Denies polydipsia, polyuria, vision changes.    Essential hypertension  This is a chronic health problem that is well controlled with current medications and lifestyle measures.  Taking amlodipine 5 mg daily and lisinopril 20 mg daily.  For the last 6 months has been having allergic reaction with swelling in different areas of the body.  1 time had swelling in the tongue.  Swelling usually relieved with Benadryl, but makes him very tired of course.  Does have EpiPen on hand.  Consulted with allergist who wants patient to stop his lisinopril.  Patient has been taking lisinopril for over 5 years.  Patient wanted to consult with me prior to discontinuing lisinopril.  Not monitoring blood pressure at home.  The patient denies chest pain, shortness of breath, headache, vision changes, epistaxis, or dyspnea on exertion.      Allergies: Lisinopril    Current Outpatient Medications Ordered in Epic   Medication Sig Dispense Refill    aspirin 81 MG EC tablet Take 81 mg by mouth every day.      Multiple Minerals (CALCIUM-MAGNESIUM-ZINC) Tab Take  by mouth.      amLODIPine (NORVASC) 10 MG Tab Take 1 Tablet by mouth every day. 90 Tablet 1    atorvastatin (LIPITOR) 40 MG Tab TAKE 1 TABLET BY MOUTH AT BEDTIME 90 Tablet 3    lisinopril (PRINIVIL) 20 MG Tab TAKE 1 TABLET BY MOUTH EVERY  DAY 90 Tablet 3    Cholecalciferol (VITAMIN D3 PO) Take  by mouth. 650% of daily allowance      POTASSIUM PO Take  by mouth. With calcium and zinc      Multiple Vitamins-Minerals (CENTRUM SILVER 50+MEN PO) Take  by mouth.      GLUCOSAMINE SULFATE PO Take  by mouth.      aspirin EC (ECOTRIN) 325 MG Tablet Delayed Response TAKE 1 TABLET BY MOUTH EVERY DAY 90 Tab 1    methylPREDNISolone (MEDROL DOSEPAK) 4 MG Tablet Therapy Pack Follow schedule on package instructions. (Patient not taking: Reported on 8/3/2023) 21 Tablet 0     No current Epic-ordered facility-administered medications on file.       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       Past Surgical History:   Procedure Laterality Date    CATARACT EXTRACTION WITH IOL Bilateral 2018    SHOULDER ARTHROSCOPY  5/20/2010    Performed by XIMENA KANG at SURGERY SAME DAY [facility] ORS    DEBRIDEMENT  5/20/2010    Performed by XIMENA KANG at SURGERY SAME DAY [facility] ORS    ROTATOR CUFF REPAIR  5/20/2010    Performed by XIMENA KANG at SURGERY SAME DAY [facility] ORS       Social History     Tobacco Use    Smoking status: Never     Passive exposure: Yes    Smokeless tobacco: Never   Vaping Use    Vaping Use: Never used   Substance Use Topics    Alcohol use: Yes     Alcohol/week: 0.6 oz     Types: 1 Glasses of wine per week     Comment: seldom    Drug use: No       Family History   Problem Relation Age of Onset    Heart Disease Mother     Cancer Father     Heart Disease Maternal Aunt     Heart Disease Maternal Uncle     Stroke Brother     Sleep Apnea Neg Hx        ROS:   As in HPI, otherwise negative for chest pain, dyspnea, abdominal pain, dysuria, blood in stool, fever         Exam: /80   Pulse 72   Temp 36.4 °C (97.6 °F)  "(Temporal)   Resp 16   Ht 1.626 m (5' 4\")   Wt 99.7 kg (219 lb 11.2 oz)   SpO2 97%  Body mass index is 37.71 kg/m².    General: Alert, pleasant, well nourished, well developed male in NAD  HEENT: Normocephalic. Eyes conjunctiva clear lids without ptosis  Neck: Supple without bruit. Thyroid is not enlarged.  Pulmonary: Clear to ausculation.  Normal effort. No rales, ronchi, or wheezing.  Cardiovascular: Normal rate and rhythm without murmur.   Neurologic: Grossly nonfocal  Lymph: No cervical or supraclavicular lymph nodes are palpable  Skin: Warm and dry.    Musculoskeletal: Normal gait.   Psych: Normal mood and affect. Alert and oriented. Judgment and insight is normal.    Please note that this dictation was created using voice recognition software. I have made every reasonable attempt to correct obvious errors, but I expect that there are errors of grammar and possibly content that I did not discover before finalizing the note.      Assessment/Plan  1. Essential hypertension  Allergy reactions possibly related to lisinopril.  Will discontinue lisinopril and increase amlodipine to 10 mg daily.  Did caution patient on potential side effect of foot swelling.  Continue with lifestyle measures.  Instructed patient to monitor his blood pressure at home 3-4 times a week.  If consistently greater than 140/90, he will follow-up with new PCP sooner than scheduled.  - amLODIPine (NORVASC) 10 MG Tab; Take 1 Tablet by mouth every day.  Dispense: 90 Tablet; Refill: 1    2. Type 2 diabetes mellitus without complication, without long-term current use of insulin (HCC)  Asymptomatic.  We will get updated labs on the way out today.  Continue with lifestyle measures in the meantime.      "

## 2023-08-04 ENCOUNTER — TELEPHONE (OUTPATIENT)
Dept: MEDICAL GROUP | Facility: PHYSICIAN GROUP | Age: 63
End: 2023-08-04
Payer: COMMERCIAL

## 2023-08-04 DIAGNOSIS — E11.9 TYPE 2 DIABETES MELLITUS WITHOUT COMPLICATION, WITHOUT LONG-TERM CURRENT USE OF INSULIN (HCC): ICD-10-CM

## 2023-08-08 ENCOUNTER — TELEPHONE (OUTPATIENT)
Dept: HEALTH INFORMATION MANAGEMENT | Facility: OTHER | Age: 63
End: 2023-08-08
Payer: COMMERCIAL

## 2023-08-08 NOTE — TELEPHONE ENCOUNTER
Spoke to patient and relayed the message from PCP. He does not have access to MyChart at this time. He verbalized an understanding of lifestyle changes to help with blood sugar reduction/control and does not want to start any medication at this time. He says his A1C has been elevated in the past and he just needs to be consistent with diet and exercise.   Given [facility] main number for scheduling an appointment for a new PCP and made aware that he has labs to be done in 3 months prior to next follow up.

## 2023-09-28 DIAGNOSIS — I10 ESSENTIAL HYPERTENSION: ICD-10-CM

## 2023-09-28 NOTE — TELEPHONE ENCOUNTER
Received request via: Pharmacy    Was the patient seen in the last year in this department? Yes    Does the patient have an active prescription (recently filled or refills available) for medication(s) requested? No    Does the patient have long term Plus and need 100 day supply (blood pressure, diabetes and cholesterol meds only)? Patient does not have SCP      Last Office Visit:08/03/2023  Last labs: 08/03/2023

## 2023-10-02 RX ORDER — LISINOPRIL 20 MG/1
20 TABLET ORAL
Qty: 90 TABLET | Refills: 3 | Status: SHIPPED | OUTPATIENT
Start: 2023-10-02 | End: 2024-02-26

## 2024-01-27 ENCOUNTER — OFFICE VISIT (OUTPATIENT)
Dept: URGENT CARE | Facility: PHYSICIAN GROUP | Age: 64
End: 2024-01-27
Payer: COMMERCIAL

## 2024-01-27 VITALS
BODY MASS INDEX: 38.58 KG/M2 | WEIGHT: 226 LBS | TEMPERATURE: 97.1 F | DIASTOLIC BLOOD PRESSURE: 86 MMHG | HEIGHT: 64 IN | HEART RATE: 73 BPM | SYSTOLIC BLOOD PRESSURE: 142 MMHG | OXYGEN SATURATION: 95 % | RESPIRATION RATE: 16 BRPM

## 2024-01-27 DIAGNOSIS — S16.1XXA STRAIN OF NECK, INITIAL ENCOUNTER: ICD-10-CM

## 2024-01-27 PROCEDURE — 3077F SYST BP >= 140 MM HG: CPT | Performed by: NURSE PRACTITIONER

## 2024-01-27 PROCEDURE — 3079F DIAST BP 80-89 MM HG: CPT | Performed by: NURSE PRACTITIONER

## 2024-01-27 PROCEDURE — 99213 OFFICE O/P EST LOW 20 MIN: CPT | Performed by: NURSE PRACTITIONER

## 2024-01-27 RX ORDER — METHOCARBAMOL 500 MG/1
1000 TABLET, FILM COATED ORAL 4 TIMES DAILY
Qty: 120 TABLET | Refills: 0 | Status: SHIPPED | OUTPATIENT
Start: 2024-01-27 | End: 2024-02-06

## 2024-01-27 RX ORDER — PREDNISONE 20 MG/1
20 TABLET ORAL DAILY
Qty: 7 TABLET | Refills: 0 | Status: SHIPPED | OUTPATIENT
Start: 2024-01-27 | End: 2024-02-03

## 2024-01-27 ASSESSMENT — FIBROSIS 4 INDEX: FIB4 SCORE: 1.16

## 2024-01-27 NOTE — PROGRESS NOTES
Subjective:     Estiven Singer is a 63 y.o. male who presents for Shoulder Pain ((R) x 4 days. )      Shoulder Pain      Pt presents for evaluation of a new problem. Estiven is a very pleasant 63-year-old male who presents to urgent care today with complaints of right-sided shoulder and neck pain that has been ongoing for the past 4 days.  His pain seems to be positional.  There is no changes in range of motion.  He denies any bony tenderness or clicking or popping with movement.  He has been using over-the-counter anti-inflammatories with little to no relief.  Negative for numbness or tingling.    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     ALLERGIES:   Allergies   Allergen Reactions    Lisinopril Itching     Swelling and itching all over     SURGHX:   Past Surgical History:   Procedure Laterality Date    CATARACT EXTRACTION WITH IOL Bilateral 2018    SHOULDER ARTHROSCOPY  5/20/2010    Performed by XIMENA KANG at SURGERY SAME DAY [facility] ORS    DEBRIDEMENT  5/20/2010    Performed by XIMENA KANG at SURGERY SAME DAY [facility] ORS    ROTATOR CUFF REPAIR  5/20/2010    Performed by XIMENA KANG at SURGERY SAME DAY [facility] ORS     SOCHX:   Social History     Socioeconomic History    Marital status: Single   Tobacco Use    Smoking status: Never     Passive exposure: Yes    Smokeless tobacco: Never   Vaping Use    Vaping Use: Never used   Substance and Sexual Activity    Alcohol use: Yes     Alcohol/week: 0.6 oz     Types: 1 Glasses of wine per week     Comment: seldom    Drug use: No    Sexual activity: Not Currently     FH:   Family History   Problem Relation Age of Onset    Heart Disease Mother     Cancer Father     Heart Disease Maternal Aunt     Heart Disease  "Maternal Uncle     Stroke Brother     Sleep Apnea Neg Hx          Objective:   BP (!) 142/86   Pulse 73   Temp 36.2 °C (97.1 °F) (Temporal)   Resp 16   Ht 1.626 m (5' 4\")   Wt 103 kg (226 lb)   SpO2 95%   BMI 38.79 kg/m²     Physical Exam  Vitals and nursing note reviewed.   Constitutional:       General: He is not in acute distress.     Appearance: Normal appearance. He is normal weight. He is not ill-appearing or toxic-appearing.   HENT:      Head: Normocephalic.      Right Ear: External ear normal.      Left Ear: External ear normal.      Nose: Nose normal.      Mouth/Throat:      Mouth: Mucous membranes are moist.   Eyes:      General:         Right eye: No discharge.         Left eye: No discharge.      Extraocular Movements: Extraocular movements intact.      Conjunctiva/sclera: Conjunctivae normal.      Pupils: Pupils are equal, round, and reactive to light.   Neck:     Pulmonary:      Effort: Pulmonary effort is normal.      Breath sounds: Normal breath sounds.   Abdominal:      General: Abdomen is flat.   Musculoskeletal:      Cervical back: Neck supple. No edema or rigidity. Pain with movement and muscular tenderness present. No spinous process tenderness. Decreased range of motion.   Lymphadenopathy:      Cervical: No cervical adenopathy.   Skin:     General: Skin is warm and dry.   Neurological:      General: No focal deficit present.      Mental Status: He is alert and oriented to person, place, and time. Mental status is at baseline.   Psychiatric:         Mood and Affect: Mood normal.         Behavior: Behavior normal.         Judgment: Judgment normal.         Assessment/Plan:   Assessment    1. Strain of neck, initial encounter  predniSONE (DELTASONE) 20 MG Tab    methocarbamol (ROBAXIN) 500 MG Tab        Differential diagnosis discussed with patient.  At this time I believe that he is muscle strain versus bony abnormality.  Patient started on 1 week course of prednisone and Robaxin inflation " and pain.  Side effects of medication discussed with patient.  I did encourage use of heat and ice as well as rest.  Patient to return for worsening or persistent symptoms.  He is in agreement with plan of care.  AVS handout given and reviewed with patient. Pt educated on red flags and when to seek treatment back in ER or UC.

## 2024-02-05 ENCOUNTER — HOSPITAL ENCOUNTER (EMERGENCY)
Facility: MEDICAL CENTER | Age: 64
End: 2024-02-05
Attending: EMERGENCY MEDICINE
Payer: COMMERCIAL

## 2024-02-05 ENCOUNTER — APPOINTMENT (OUTPATIENT)
Dept: RADIOLOGY | Facility: MEDICAL CENTER | Age: 64
End: 2024-02-05
Attending: EMERGENCY MEDICINE
Payer: COMMERCIAL

## 2024-02-05 VITALS
DIASTOLIC BLOOD PRESSURE: 108 MMHG | WEIGHT: 218 LBS | SYSTOLIC BLOOD PRESSURE: 180 MMHG | RESPIRATION RATE: 16 BRPM | HEART RATE: 85 BPM | OXYGEN SATURATION: 92 % | TEMPERATURE: 98.2 F | BODY MASS INDEX: 37.42 KG/M2

## 2024-02-05 DIAGNOSIS — M54.12 CERVICAL RADICULOPATHY: ICD-10-CM

## 2024-02-05 LAB — EKG IMPRESSION: NORMAL

## 2024-02-05 PROCEDURE — 93005 ELECTROCARDIOGRAM TRACING: CPT | Performed by: EMERGENCY MEDICINE

## 2024-02-05 PROCEDURE — 99283 EMERGENCY DEPT VISIT LOW MDM: CPT

## 2024-02-05 PROCEDURE — 71045 X-RAY EXAM CHEST 1 VIEW: CPT

## 2024-02-05 ASSESSMENT — PAIN DESCRIPTION - PAIN TYPE: TYPE: ACUTE PAIN

## 2024-02-05 ASSESSMENT — PAIN DESCRIPTION - DESCRIPTORS: DESCRIPTORS: ACHING

## 2024-02-05 ASSESSMENT — FIBROSIS 4 INDEX: FIB4 SCORE: 1.16

## 2024-02-05 NOTE — ED PROVIDER NOTES
ED PHYSICIAN NOTE    CHIEF COMPLAINT  Chief Complaint   Patient presents with    Shoulder Pain     Patient with right shoulder pain x 2 weeks. Seen at [facility] today and had xrays. Patient endorses being told to come to ER based on xray.        EXTERNAL RECORDS REVIEWED  Outpatient Notes patient was seen at the [facility] today.  Patient has had pain in his right shoulder for about 2 weeks.  Had an abnormal x-ray of the shoulder with abnormality noted around the first rib near the sternum.  Primary follow-up was recommended.  The patient was referred to the ER to rule out cardiopulmonary source of symptoms    X-ray of the cervical spine and shoulder were completed.  MRI of the cervical spine was ordered.  He was referred to physical therapy and spine.  He was prescribed Mobic.    HPI/ROS      Estiven Singer is a 63 y.o. male who presents with right-sided posterior shoulder pain.  Started 1 day after karate.  Patient was doing takedown maneuvers.  The pain is located in his right lower cervical paraspinous musculature parascapular region.  The pain is worse when he looks up.  He it is worse when he lays on either side.  It feels better if he lays back with his head/neck flexed.  Pain is better if he holds his right arm above his head.  The pain radiates outwards into his shoulder towards the AC joint.  He denies weakness or numbness in the extremity.  No skin change.  No skin rash.  No joint swelling.    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       SOCIAL HISTORY  Social History     Tobacco Use    Smoking status: Never     Passive exposure: Yes    Smokeless tobacco: Never   Vaping Use    Vaping Use: Never used   Substance Use Topics    Alcohol use: Yes      Alcohol/week: 0.6 oz     Types: 1 Glasses of wine per week     Comment: seldom    Drug use: No       CURRENT MEDICATIONS  Home Medications    **Home medications have not yet been reviewed for this encounter**         ALLERGIES  Allergies   Allergen Reactions    Lisinopril Itching     Swelling and itching all over       PHYSICAL EXAM  VITAL SIGNS: BP (!) 180/111   Pulse 84   Temp 36.6 °C (97.9 °F) (Temporal)   Resp 14   Wt 98.9 kg (218 lb)   SpO2 95%   BMI 37.42 kg/m²    Constitutional: Awake and alert  HENT: Normal inspection  Eyes: Normal inspection  Neck: Tenderness of the right lower paracervical musculature and rhomboids in the parascapular region.  Cardiovascular: Normal heart rate, Normal rhythm.  Symmetric peripheral pulses.   Thorax & Lungs: No respiratory distress, No wheezing, No rales, No rhonchi, No chest tenderness.   Abdomen: Bowel sounds normal, soft, non-distended, nontender, no mass  Skin: No obvious rash.  Back: No tenderness, No CVA tenderness.   Extremities: No clubbing, cyanosis, edema, no Homans or cords.  Neurologic: Grossly normal   Psychiatric: Normal for situation     DIAGNOSTIC STUDIES / PROCEDURES  LABS/EKG  Results for orders placed or performed during the hospital encounter of 24   EKG   Result Value Ref Range    Report       [facility] Emergency Dept.    Test Date:  2024  Pt Name:    SARAH AYOUB                 Department: ER  MRN:        [id number]                      Room:       [facility]  Gender:     Male                         Technician: [id number]  :        1960                   Requested By:GEORGE BIRD  Order #:    [id number]                    Reading MD: GEORGE BIRD MD    Measurements  Intervals                                Axis  Rate:       73                           P:          36  VA:         169                          QRS:        4  QRSD:       96                           T:          53  QT:         394  QTc:         435    Interpretive Statements  Sinus rhythm  Compared to ECG 11/17/2016 21:59:07  No significant changes  Electronically Signed On 02- 14:10:08 PST by GEORGE BIRD MD        I have independently interpreted this EKG as documented above  Rhythm strip interpretation-sinus rhythm    RADIOLOGY  I have independently interpreted the diagnostic imaging associated with this visit and am waiting the final reading from the radiologist.   My preliminary interpretation is as follows: Chest x-ray without infiltrate or bony abnormality identified.  Radiologist interpretation:   DX-CHEST-PORTABLE (1 VIEW)    (Results Pending)         COURSE & MEDICAL DECISION MAKING    INITIAL ASSESSMENT, COURSE AND PLAN  Care Narrative: Patient presents with right posterior shoulder pain radiating to the AC region.  Neurovascular intact.  Cannot pain is clearly provoked with certain movements.  He is tender to palpation.  Very unlikely cardiopulmonary problem.  Obtained EKG and chest x-ray as he was referred to evaluate this.  The studies are reassuring.  I agree with plan for MRI and spine referral with physical therapy.  The patient has already been prescribed a course of prednisone that did not help.  He was just prescribed Mobic.  This seems like a reasonable option.  Patient was precautioned to return to the ER if he has any chest pain, shortness of breath change in character of symptoms, weakness numbness neurologic symptoms or concern.        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  Cervical radiculopathy    This dictation was created using voice recognition software. The accuracy of the dictation is limited to the abilities of the software. I expect there may be some errors of grammar and possibly content. The nursing notes were reviewed and certain aspects of this information were incorporated into this note.    Electronically signed by: Henry Sidhu M.D., 2/5/2024

## 2024-03-12 ENCOUNTER — OFFICE VISIT (OUTPATIENT)
Dept: MEDICAL GROUP | Facility: PHYSICIAN GROUP | Age: 64
End: 2024-03-12
Payer: COMMERCIAL

## 2024-03-12 VITALS
DIASTOLIC BLOOD PRESSURE: 86 MMHG | HEIGHT: 64 IN | TEMPERATURE: 97.3 F | SYSTOLIC BLOOD PRESSURE: 134 MMHG | BODY MASS INDEX: 38.58 KG/M2 | OXYGEN SATURATION: 94 % | HEART RATE: 83 BPM | RESPIRATION RATE: 14 BRPM | WEIGHT: 226 LBS

## 2024-03-12 DIAGNOSIS — Z11.3 SCREEN FOR STD (SEXUALLY TRANSMITTED DISEASE): ICD-10-CM

## 2024-03-12 DIAGNOSIS — Z00.00 PHYSICAL EXAM, ANNUAL: ICD-10-CM

## 2024-03-12 DIAGNOSIS — E11.9 TYPE 2 DIABETES MELLITUS WITHOUT COMPLICATION, WITHOUT LONG-TERM CURRENT USE OF INSULIN (HCC): ICD-10-CM

## 2024-03-12 DIAGNOSIS — E78.5 HYPERLIPIDEMIA, UNSPECIFIED HYPERLIPIDEMIA TYPE: ICD-10-CM

## 2024-03-12 DIAGNOSIS — I10 ESSENTIAL HYPERTENSION: ICD-10-CM

## 2024-03-12 DIAGNOSIS — Z11.59 ENCOUNTER FOR HEPATITIS C SCREENING TEST FOR LOW RISK PATIENT: ICD-10-CM

## 2024-03-12 DIAGNOSIS — E78.2 MIXED HYPERLIPIDEMIA: ICD-10-CM

## 2024-03-12 LAB
HBA1C MFR BLD: 8.3 % (ref ?–5.8)
POCT INT CON NEG: NEGATIVE
POCT INT CON POS: POSITIVE

## 2024-03-12 PROCEDURE — 3075F SYST BP GE 130 - 139MM HG: CPT | Performed by: PHYSICIAN ASSISTANT

## 2024-03-12 PROCEDURE — 99214 OFFICE O/P EST MOD 30 MIN: CPT | Performed by: PHYSICIAN ASSISTANT

## 2024-03-12 PROCEDURE — 83036 HEMOGLOBIN GLYCOSYLATED A1C: CPT | Performed by: PHYSICIAN ASSISTANT

## 2024-03-12 PROCEDURE — 3079F DIAST BP 80-89 MM HG: CPT | Performed by: PHYSICIAN ASSISTANT

## 2024-03-12 RX ORDER — ATORVASTATIN CALCIUM 40 MG/1
40 TABLET, FILM COATED ORAL DAILY
Qty: 90 TABLET | Refills: 3 | Status: SHIPPED | OUTPATIENT
Start: 2024-03-12

## 2024-03-12 RX ORDER — AMLODIPINE BESYLATE 10 MG/1
10 TABLET ORAL
Qty: 90 TABLET | Refills: 3 | Status: SHIPPED | OUTPATIENT
Start: 2024-03-12

## 2024-03-12 ASSESSMENT — PATIENT HEALTH QUESTIONNAIRE - PHQ9: CLINICAL INTERPRETATION OF PHQ2 SCORE: 0

## 2024-03-12 ASSESSMENT — FIBROSIS 4 INDEX: FIB4 SCORE: 1.16

## 2024-03-12 NOTE — PROGRESS NOTES
CC:    Chief Complaint   Patient presents with    \[facility]\"" Care       HISTORY OF THE PRESENT ILLNESS: Patient is a 63 y.o. male presenting to establish primary care     Pt seen by [facility] for pain in his R shoulder.   Pt's A1C today at 8.3%. Not currently on any medications.   Pt on norvasc for HTN  Taking lipitor 40mg for HLD.     No problem-specific Assessment & Plan notes found for this encounter.    Allergies: Lisinopril    Current Outpatient Medications Ordered in Epic   Medication Sig Dispense Refill    aspirin 81 MG EC tablet Take 81 mg by mouth every day.      Multiple Minerals (CALCIUM-MAGNESIUM-ZINC) Tab Take  by mouth.      amLODIPine (NORVASC) 10 MG Tab Take 1 Tablet by mouth every day. 90 Tablet 1    atorvastatin (LIPITOR) 40 MG Tab TAKE 1 TABLET BY MOUTH AT BEDTIME 90 Tablet 3    Cholecalciferol (VITAMIN D3 PO) Take  by mouth. 650% of daily allowance      POTASSIUM PO Take  by mouth. With calcium and zinc      Multiple Vitamins-Minerals (CENTRUM SILVER 50+MEN PO) Take  by mouth.      GLUCOSAMINE SULFATE PO Take  by mouth.       No current Epic-ordered facility-administered medications on file.       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       Past Surgical History:   Procedure Laterality Date    CATARACT EXTRACTION WITH IOL Bilateral 2018    SHOULDER ARTHROSCOPY  5/20/2010    Performed by XIMENA KANG at SURGERY SAME DAY [facility] ORS    DEBRIDEMENT  5/20/2010    Performed by XIMENA KANG at SURGERY SAME DAY [facility] ORS    ROTATOR CUFF REPAIR  5/20/2010    Performed by XIMENA KANG at SURGERY SAME DAY [facility] ORS       Social History     Tobacco Use    Smoking status: Never     Passive exposure: Yes    Smokeless tobacco: Never   Vaping Use    Vaping Use: Never  "used   Substance Use Topics    Alcohol use: Yes     Alcohol/week: 0.6 oz     Types: 1 Glasses of wine per week     Comment: seldom    Drug use: No       Social History     Social History Narrative    Not on file       Family History   Problem Relation Age of Onset    Heart Disease Mother     Cancer Father     Heart Disease Maternal Aunt     Heart Disease Maternal Uncle     Stroke Brother     Sleep Apnea Neg Hx        ROS:     - Constitutional: Negative for fever, chills, unexpected weight change, and fatigue/generalized weakness.    .      Exam: /86   Pulse 83   Temp 36.3 °C (97.3 °F) (Temporal)   Resp 14   Ht 1.626 m (5' 4\")   Wt 103 kg (226 lb)   SpO2 94%  Body mass index is 38.79 kg/m².    General: Normal appearing. No acute distress.  Skin: Warm and dry.  No obvious lesions.  HEENT: Normocephalic. Eyes conjunctiva clear lids without ptosis, ears normal shape and contour  Cardiovascular: Regular rate and rhythm without murmur.   Respiratory: Clear to auscultation bilaterally, no rhonchi wheezing or rales.  Neurologic: Grossly nonfocal, A&O x3, gait normal,  Musculoskeletal: No deformity or swelling.   Extremities: No extremity cyanosis, clubbing, or edema.  Psych: Normal mood and affect. Judgment and insight is normal.    Please note that this dictation was created using voice recognition software. I have made every reasonable attempt to correct obvious errors, but I expect that there are errors of grammar and possibly content that I did not discover before finalizing the note.    Monofilament testing with a 10 gram force: sensation intact: intact bilaterally  Visual Inspection: Feet without maceration, ulcers, fissures.  Pedal pulses: intact bilaterally      Assessment/Plan  1. Type 2 diabetes mellitus without complication, without long-term current use of insulin (HCC)  Start on metformin 500mg and recheck A1C in three months  - POCT Hemoglobin A1C  - MICROALBUMIN CREAT RATIO URINE; Future  - " metFORMIN (GLUCOPHAGE) 500 MG Tab; Take 1 Tablet by mouth 2 times a day with meals.  Dispense: 180 Tablet; Refill: 0  - Diabetic Monofilament LE Exam    2. Essential hypertension  Well controlled. Continue norvasc 10mg  - amLODIPine (NORVASC) 10 MG Tab; Take 1 Tablet by mouth every day.  Dispense: 90 Tablet; Refill: 3    3. Mixed hyperlipidemia  Continue lipitor 40mg  - Lipid Profile; Future    4. Physical exam, annual  Labs printed  - CBC WITH DIFFERENTIAL; Future  - Comp Metabolic Panel; Future    5. Screen for STD (sexually transmitted disease)    - HIV AG/AB COMBO ASSAY SCREENING; Future    6. Encounter for hepatitis C screening test for low risk patient    - HEP C VIRUS ANTIBODY; Future    7. Hyperlipidemia, unspecified hyperlipidemia type    - atorvastatin (LIPITOR) 40 MG Tab; Take 1 Tablet by mouth every day.  Dispense: 90 Tablet; Refill: 3

## 2024-06-07 DIAGNOSIS — E11.9 TYPE 2 DIABETES MELLITUS WITHOUT COMPLICATION, WITHOUT LONG-TERM CURRENT USE OF INSULIN (HCC): ICD-10-CM

## 2024-06-07 NOTE — TELEPHONE ENCOUNTER
Received request via: Patient    Was the patient seen in the last year in this department? Yes    Does the patient have an active prescription (recently filled or refills available) for medication(s) requested? No    Pharmacy Name: Pharmacy:   [id number] - [place], [place] - [address] [address] [facility] [address]     Does the patient have assisted Plus and need 100 day supply (blood pressure, diabetes and cholesterol meds only)? Patient does not have SCP

## 2024-06-10 ENCOUNTER — HOSPITAL ENCOUNTER (OUTPATIENT)
Dept: LAB | Facility: MEDICAL CENTER | Age: 64
End: 2024-06-10
Attending: PHYSICIAN ASSISTANT
Payer: COMMERCIAL

## 2024-06-10 DIAGNOSIS — Z11.59 ENCOUNTER FOR HEPATITIS C SCREENING TEST FOR LOW RISK PATIENT: ICD-10-CM

## 2024-06-10 DIAGNOSIS — E11.9 TYPE 2 DIABETES MELLITUS WITHOUT COMPLICATION, WITHOUT LONG-TERM CURRENT USE OF INSULIN (HCC): ICD-10-CM

## 2024-06-10 DIAGNOSIS — E78.2 MIXED HYPERLIPIDEMIA: ICD-10-CM

## 2024-06-10 DIAGNOSIS — Z00.00 PHYSICAL EXAM, ANNUAL: ICD-10-CM

## 2024-06-10 DIAGNOSIS — Z11.3 SCREEN FOR STD (SEXUALLY TRANSMITTED DISEASE): ICD-10-CM

## 2024-06-10 LAB
ALBUMIN SERPL BCP-MCNC: 4.4 G/DL (ref 3.2–4.9)
ALBUMIN/GLOB SERPL: 1.8 G/DL
ALP SERPL-CCNC: 88 U/L (ref 30–99)
ALT SERPL-CCNC: 21 U/L (ref 2–50)
ANION GAP SERPL CALC-SCNC: 13 MMOL/L (ref 7–16)
AST SERPL-CCNC: 20 U/L (ref 12–45)
BASOPHILS # BLD AUTO: 0.8 % (ref 0–1.8)
BASOPHILS # BLD: 0.05 K/UL (ref 0–0.12)
BILIRUB SERPL-MCNC: 0.4 MG/DL (ref 0.1–1.5)
BUN SERPL-MCNC: 9 MG/DL (ref 8–22)
CALCIUM ALBUM COR SERPL-MCNC: 8.8 MG/DL (ref 8.5–10.5)
CALCIUM SERPL-MCNC: 9.1 MG/DL (ref 8.5–10.5)
CHLORIDE SERPL-SCNC: 103 MMOL/L (ref 96–112)
CHOLEST SERPL-MCNC: 225 MG/DL (ref 100–199)
CO2 SERPL-SCNC: 24 MMOL/L (ref 20–33)
CREAT SERPL-MCNC: 0.74 MG/DL (ref 0.5–1.4)
CREAT UR-MCNC: 174.05 MG/DL
EOSINOPHIL # BLD AUTO: 0.16 K/UL (ref 0–0.51)
EOSINOPHIL NFR BLD: 2.6 % (ref 0–6.9)
ERYTHROCYTE [DISTWIDTH] IN BLOOD BY AUTOMATED COUNT: 40.4 FL (ref 35.9–50)
FASTING STATUS PATIENT QL REPORTED: NORMAL
GFR SERPLBLD CREATININE-BSD FMLA CKD-EPI: 102 ML/MIN/1.73 M 2
GLOBULIN SER CALC-MCNC: 2.5 G/DL (ref 1.9–3.5)
GLUCOSE SERPL-MCNC: 166 MG/DL (ref 65–99)
HCT VFR BLD AUTO: 44 % (ref 42–52)
HDLC SERPL-MCNC: 44 MG/DL
HGB BLD-MCNC: 15.6 G/DL (ref 14–18)
IMM GRANULOCYTES # BLD AUTO: 0.01 K/UL (ref 0–0.11)
IMM GRANULOCYTES NFR BLD AUTO: 0.2 % (ref 0–0.9)
LDLC SERPL CALC-MCNC: 161 MG/DL
LYMPHOCYTES # BLD AUTO: 1.52 K/UL (ref 1–4.8)
LYMPHOCYTES NFR BLD: 24.9 % (ref 22–41)
MCH RBC QN AUTO: 29.9 PG (ref 27–33)
MCHC RBC AUTO-ENTMCNC: 35.5 G/DL (ref 32.3–36.5)
MCV RBC AUTO: 84.5 FL (ref 81.4–97.8)
MICROALBUMIN UR-MCNC: 19.3 MG/DL
MICROALBUMIN/CREAT UR: 111 MG/G (ref 0–30)
MONOCYTES # BLD AUTO: 0.55 K/UL (ref 0–0.85)
MONOCYTES NFR BLD AUTO: 9 % (ref 0–13.4)
NEUTROPHILS # BLD AUTO: 3.81 K/UL (ref 1.82–7.42)
NEUTROPHILS NFR BLD: 62.5 % (ref 44–72)
NRBC # BLD AUTO: 0 K/UL
NRBC BLD-RTO: 0 /100 WBC (ref 0–0.2)
PLATELET # BLD AUTO: 287 K/UL (ref 164–446)
PMV BLD AUTO: 10.4 FL (ref 9–12.9)
POTASSIUM SERPL-SCNC: 3.6 MMOL/L (ref 3.6–5.5)
PROT SERPL-MCNC: 6.9 G/DL (ref 6–8.2)
RBC # BLD AUTO: 5.21 M/UL (ref 4.7–6.1)
SODIUM SERPL-SCNC: 140 MMOL/L (ref 135–145)
TRIGL SERPL-MCNC: 99 MG/DL (ref 0–149)
WBC # BLD AUTO: 6.1 K/UL (ref 4.8–10.8)

## 2024-06-10 PROCEDURE — 85025 COMPLETE CBC W/AUTO DIFF WBC: CPT

## 2024-06-10 PROCEDURE — 82043 UR ALBUMIN QUANTITATIVE: CPT

## 2024-06-10 PROCEDURE — 82570 ASSAY OF URINE CREATININE: CPT

## 2024-06-10 PROCEDURE — 87389 HIV-1 AG W/HIV-1&-2 AB AG IA: CPT

## 2024-06-10 PROCEDURE — 80061 LIPID PANEL: CPT

## 2024-06-10 PROCEDURE — 36415 COLL VENOUS BLD VENIPUNCTURE: CPT

## 2024-06-10 PROCEDURE — 80053 COMPREHEN METABOLIC PANEL: CPT

## 2024-06-10 PROCEDURE — 86803 HEPATITIS C AB TEST: CPT

## 2024-06-11 LAB
HCV AB SER QL: NORMAL
HIV 1+2 AB+HIV1 P24 AG SERPL QL IA: NORMAL

## 2024-06-12 ENCOUNTER — OFFICE VISIT (OUTPATIENT)
Dept: MEDICAL GROUP | Facility: PHYSICIAN GROUP | Age: 64
End: 2024-06-12
Payer: COMMERCIAL

## 2024-06-12 VITALS
SYSTOLIC BLOOD PRESSURE: 128 MMHG | HEART RATE: 72 BPM | BODY MASS INDEX: 37.39 KG/M2 | HEIGHT: 64 IN | RESPIRATION RATE: 14 BRPM | TEMPERATURE: 97.6 F | WEIGHT: 219 LBS | DIASTOLIC BLOOD PRESSURE: 84 MMHG | OXYGEN SATURATION: 94 %

## 2024-06-12 DIAGNOSIS — E11.29 TYPE 2 DIABETES MELLITUS WITH DIABETIC MICROALBUMINURIA, WITHOUT LONG-TERM CURRENT USE OF INSULIN (HCC): ICD-10-CM

## 2024-06-12 DIAGNOSIS — E78.2 MIXED HYPERLIPIDEMIA: ICD-10-CM

## 2024-06-12 DIAGNOSIS — R80.9 TYPE 2 DIABETES MELLITUS WITH DIABETIC MICROALBUMINURIA, WITHOUT LONG-TERM CURRENT USE OF INSULIN (HCC): ICD-10-CM

## 2024-06-12 DIAGNOSIS — I10 ESSENTIAL HYPERTENSION: ICD-10-CM

## 2024-06-12 LAB
HBA1C MFR BLD: 6.8 % (ref ?–5.8)
POCT INT CON NEG: NEGATIVE
POCT INT CON POS: POSITIVE

## 2024-06-12 PROCEDURE — 3074F SYST BP LT 130 MM HG: CPT | Performed by: PHYSICIAN ASSISTANT

## 2024-06-12 PROCEDURE — 83036 HEMOGLOBIN GLYCOSYLATED A1C: CPT | Performed by: PHYSICIAN ASSISTANT

## 2024-06-12 PROCEDURE — 3079F DIAST BP 80-89 MM HG: CPT | Performed by: PHYSICIAN ASSISTANT

## 2024-06-12 PROCEDURE — 99214 OFFICE O/P EST MOD 30 MIN: CPT | Performed by: PHYSICIAN ASSISTANT

## 2024-06-12 RX ORDER — LOSARTAN POTASSIUM 100 MG/1
100 TABLET ORAL DAILY
Qty: 90 TABLET | Refills: 1 | Status: SHIPPED | OUTPATIENT
Start: 2024-06-12

## 2024-06-12 ASSESSMENT — FIBROSIS 4 INDEX: FIB4 SCORE: 0.94

## 2024-06-12 NOTE — PROGRESS NOTES
"CC:  Chief Complaint   Patient presents with    Diabetes Follow-up       HISTORY OF PRESENT ILLNESS: Patient is a 64 y.o. male established patient presenting with issues below  Patient's A1c today at 6.8%.  Currently on metformin 500 mg twice daily.  Pt's lab significant for microalbuminuria.   Pt's lipids elevated. Notes that he has not been taking his lipitor as often as he should.     Current Outpatient Medications   Medication Sig Dispense Refill    metFORMIN (GLUCOPHAGE) 500 MG Tab TAKE 1 TABLET BY MOUTH TWICE DAILY WITH MEALS 180 Tablet 0    atorvastatin (LIPITOR) 40 MG Tab Take 1 Tablet by mouth every day. 90 Tablet 3    amLODIPine (NORVASC) 10 MG Tab Take 1 Tablet by mouth every day. 90 Tablet 3    aspirin 81 MG EC tablet Take 81 mg by mouth every day.      Multiple Minerals (CALCIUM-MAGNESIUM-ZINC) Tab Take  by mouth.      Cholecalciferol (VITAMIN D3 PO) Take  by mouth. 650% of daily allowance      POTASSIUM PO Take  by mouth. With calcium and zinc      Multiple Vitamins-Minerals (CENTRUM SILVER 50+MEN PO) Take  by mouth.      GLUCOSAMINE SULFATE PO Take  by mouth.       No current facility-administered medications for this visit.        ROS:     ROS    Exam:    /84   Pulse 72   Temp 36.4 °C (97.6 °F) (Temporal)   Resp 14   Ht 1.626 m (5' 4\")   Wt 99.3 kg (219 lb)   SpO2 94%  Body mass index is 37.59 kg/m².    General:  Well nourished, well developed male in NAD  Head is grossly normal.  Neck: Supple.   Pulmonary: Clear to auscultation. No ronchi, wheezing or rales  Cardiac: Regular rate and rhythm. No murmurs.  Skin: Warm and dry. No obvious lesions  Neuro: Normal muscle tone. Gait normal. Alert and oriented.  Psych: Normal mood and affect      Please note that this dictation was created using voice recognition software. I have made every reasonable attempt to correct obvious errors, but I expect that there are errors of grammar and possibly content that I did not discover before finalizing the " note.        Assessment/Plan:    1. Type 2 diabetes mellitus with diabetic microalbuminuria, without long-term current use of insulin (HCC)  Much improved.  Continue metformin 500 mg.  - POCT Hemoglobin A1C    2. Essential hypertension  Stop amlodipine.  Start on losartan 100 mg.  - losartan (COZAAR) 100 MG Tab; Take 1 Tablet by mouth every day.  Dispense: 90 Tablet; Refill: 1    3. Mixed hyperlipidemia  Advised to start taking his Lipitor 40 mg again.

## 2024-08-26 ENCOUNTER — OFFICE VISIT (OUTPATIENT)
Dept: MEDICAL GROUP | Facility: PHYSICIAN GROUP | Age: 64
End: 2024-08-26
Payer: COMMERCIAL

## 2024-08-26 VITALS
HEIGHT: 64 IN | BODY MASS INDEX: 38.07 KG/M2 | TEMPERATURE: 97.1 F | SYSTOLIC BLOOD PRESSURE: 130 MMHG | OXYGEN SATURATION: 94 % | HEART RATE: 71 BPM | RESPIRATION RATE: 14 BRPM | WEIGHT: 223 LBS | DIASTOLIC BLOOD PRESSURE: 82 MMHG

## 2024-08-26 DIAGNOSIS — M21.612 BUNION OF GREAT TOE OF LEFT FOOT: ICD-10-CM

## 2024-08-26 DIAGNOSIS — I10 ESSENTIAL HYPERTENSION: ICD-10-CM

## 2024-08-26 PROCEDURE — 3075F SYST BP GE 130 - 139MM HG: CPT | Performed by: PHYSICIAN ASSISTANT

## 2024-08-26 PROCEDURE — 3079F DIAST BP 80-89 MM HG: CPT | Performed by: PHYSICIAN ASSISTANT

## 2024-08-26 PROCEDURE — 99214 OFFICE O/P EST MOD 30 MIN: CPT | Performed by: PHYSICIAN ASSISTANT

## 2024-08-26 RX ORDER — LOSARTAN POTASSIUM AND HYDROCHLOROTHIAZIDE 12.5; 1 MG/1; MG/1
1 TABLET ORAL DAILY
Qty: 30 TABLET | Refills: 0 | Status: SHIPPED | OUTPATIENT
Start: 2024-08-26

## 2024-08-26 ASSESSMENT — FIBROSIS 4 INDEX: FIB4 SCORE: 0.94

## 2024-08-26 NOTE — PROGRESS NOTES
"CC:  Chief Complaint   Patient presents with    Hypertension       HISTORY OF PRESENT ILLNESS: Patient is a 64 y.o. male established patient presenting with issues below  Pt's BP has been elevated in the last month. His home BP readings have been trending in 170s systolic. Currently on losatan and norvasc. Was seen in walk in clinic in [place] and restarted on norvasc 10mg.   Patient has been having a bunion over his left foot.  He is will be traveling to [place] soon so he is concerned that this will affect his trip.    Current Outpatient Medications   Medication Sig Dispense Refill    losartan (COZAAR) 100 MG Tab Take 1 Tablet by mouth every day. 90 Tablet 1    metFORMIN (GLUCOPHAGE) 500 MG Tab TAKE 1 TABLET BY MOUTH TWICE DAILY WITH MEALS 180 Tablet 0    atorvastatin (LIPITOR) 40 MG Tab Take 1 Tablet by mouth every day. 90 Tablet 3    amLODIPine (NORVASC) 10 MG Tab Take 1 Tablet by mouth every day. 90 Tablet 3    aspirin 81 MG EC tablet Take 81 mg by mouth every day.      Multiple Minerals (CALCIUM-MAGNESIUM-ZINC) Tab Take  by mouth.      Cholecalciferol (VITAMIN D3 PO) Take  by mouth. 650% of daily allowance      POTASSIUM PO Take  by mouth. With calcium and zinc      Multiple Vitamins-Minerals (CENTRUM SILVER 50+MEN PO) Take  by mouth.      GLUCOSAMINE SULFATE PO Take  by mouth.       No current facility-administered medications for this visit.        ROS:     ROS    Exam:    /82   Pulse 71   Temp 36.2 °C (97.1 °F) (Temporal)   Resp 14   Ht 1.626 m (5' 4\")   Wt 101 kg (223 lb)   SpO2 94%  Body mass index is 38.28 kg/m².    General:  Well nourished, well developed male in NAD  Head is grossly normal.  Neck: Supple.   Pulmonary: Clear to auscultation. No ronchi, wheezing or rales  Cardiac: Regular rate and rhythm. No murmurs.  Skin: Warm and dry. No obvious lesions  Neuro: Normal muscle tone. Gait normal. Alert and oriented.  Psych: Normal mood and affect      Please note that this dictation was created " using voice recognition software. I have made every reasonable attempt to correct obvious errors, but I expect that there are errors of grammar and possibly content that I did not discover before finalizing the note.        Assessment/Plan:    1. Essential hypertension  Continue with losartan and Norvasc at current doses and will add on HCTZ 12.5 mg.  Recheck in 1 month  - losartan-hydrochlorothiazide (HYZAAR) 100-12.5 MG per tablet; Take 1 Tablet by mouth every day.  Dispense: 30 Tablet; Refill: 0    2. Bunion of great toe of left foot  Referral to podiatry placed  - Referral to Podiatry

## 2024-09-05 DIAGNOSIS — E11.9 TYPE 2 DIABETES MELLITUS WITHOUT COMPLICATION, WITHOUT LONG-TERM CURRENT USE OF INSULIN (HCC): ICD-10-CM

## 2024-09-05 NOTE — TELEPHONE ENCOUNTER
Received request via: Pharmacy    Was the patient seen in the last year in this department? Yes    Does the patient have an active prescription (recently filled or refills available) for medication(s) requested? No    [facility]     Does the patient have longterm Plus and need 100-day supply? (This applies to ALL medications) Patient does not have SCP

## 2024-09-26 ENCOUNTER — OFFICE VISIT (OUTPATIENT)
Dept: MEDICAL GROUP | Facility: PHYSICIAN GROUP | Age: 64
End: 2024-09-26
Payer: COMMERCIAL

## 2024-09-26 VITALS
DIASTOLIC BLOOD PRESSURE: 68 MMHG | SYSTOLIC BLOOD PRESSURE: 110 MMHG | HEIGHT: 64 IN | RESPIRATION RATE: 16 BRPM | BODY MASS INDEX: 38.41 KG/M2 | OXYGEN SATURATION: 95 % | TEMPERATURE: 98 F | HEART RATE: 85 BPM | WEIGHT: 225 LBS

## 2024-09-26 DIAGNOSIS — I10 ESSENTIAL HYPERTENSION: ICD-10-CM

## 2024-09-26 PROCEDURE — 3074F SYST BP LT 130 MM HG: CPT | Performed by: PHYSICIAN ASSISTANT

## 2024-09-26 PROCEDURE — 99213 OFFICE O/P EST LOW 20 MIN: CPT | Performed by: PHYSICIAN ASSISTANT

## 2024-09-26 PROCEDURE — 3078F DIAST BP <80 MM HG: CPT | Performed by: PHYSICIAN ASSISTANT

## 2024-09-26 RX ORDER — LOSARTAN POTASSIUM AND HYDROCHLOROTHIAZIDE 12.5; 1 MG/1; MG/1
1 TABLET ORAL DAILY
Qty: 90 TABLET | Refills: 1 | Status: SHIPPED | OUTPATIENT
Start: 2024-09-26

## 2024-09-26 ASSESSMENT — FIBROSIS 4 INDEX: FIB4 SCORE: 0.94

## 2024-09-26 NOTE — PROGRESS NOTES
"CC:  Chief Complaint   Patient presents with    Follow-Up     BP       HISTORY OF PRESENT ILLNESS: Patient is a 64 y.o. male established patient presenting with issues below  Home BP readings at 140-150s systolic. Pt's blood pressure recheck here in office is left arm 106/66 and right arm 124/66.    Current Outpatient Medications   Medication Sig Dispense Refill    metFORMIN (GLUCOPHAGE) 500 MG Tab TAKE 1 TABLET BY MOUTH TWICE DAILY WITH MEALS 180 Tablet 0    losartan-hydrochlorothiazide (HYZAAR) 100-12.5 MG per tablet Take 1 Tablet by mouth every day. 30 Tablet 0    atorvastatin (LIPITOR) 40 MG Tab Take 1 Tablet by mouth every day. 90 Tablet 3    amLODIPine (NORVASC) 10 MG Tab Take 1 Tablet by mouth every day. 90 Tablet 3    aspirin 81 MG EC tablet Take 81 mg by mouth every day.      Multiple Minerals (CALCIUM-MAGNESIUM-ZINC) Tab Take  by mouth.      Cholecalciferol (VITAMIN D3 PO) Take  by mouth. 650% of daily allowance      POTASSIUM PO Take  by mouth. With calcium and zinc      Multiple Vitamins-Minerals (CENTRUM SILVER 50+MEN PO) Take  by mouth.      GLUCOSAMINE SULFATE PO Take  by mouth.       No current facility-administered medications for this visit.        ROS:     ROS    Exam:    /68   Pulse 85   Temp 36.7 °C (98 °F) (Temporal)   Resp 16   Ht 1.626 m (5' 4\")   Wt 102 kg (225 lb)   SpO2 95%  Body mass index is 38.62 kg/m².    General:  Well nourished, well developed male in NAD  Head is grossly normal.  Neck: Supple.   Skin: Warm and dry. No obvious lesions  Neuro: Normal muscle tone. Gait normal. Alert and oriented.  Psych: Normal mood and affect      Please note that this dictation was created using voice recognition software. I have made every reasonable attempt to correct obvious errors, but I expect that there are errors of grammar and possibly content that I did not discover before finalizing the note.        Assessment/Plan:    1. Essential hypertension  Continue with losartan/HCTZ " 100/12.5 mg.  Continue to monitor blood pressure at home and bring blood pressure cuff to next appointment so we can compare in office.  - losartan-hydrochlorothiazide (HYZAAR) 100-12.5 MG per tablet; Take 1 Tablet by mouth every day.  Dispense: 90 Tablet; Refill: 1

## 2024-12-09 ENCOUNTER — TELEPHONE (OUTPATIENT)
Dept: URGENT CARE | Facility: CLINIC | Age: 64
End: 2024-12-09
Payer: COMMERCIAL

## 2024-12-11 DIAGNOSIS — E11.9 TYPE 2 DIABETES MELLITUS WITHOUT COMPLICATION, WITHOUT LONG-TERM CURRENT USE OF INSULIN (HCC): ICD-10-CM

## 2024-12-12 ENCOUNTER — APPOINTMENT (OUTPATIENT)
Dept: MEDICAL GROUP | Facility: PHYSICIAN GROUP | Age: 64
End: 2024-12-12
Payer: COMMERCIAL

## 2024-12-12 VITALS
OXYGEN SATURATION: 94 % | RESPIRATION RATE: 16 BRPM | DIASTOLIC BLOOD PRESSURE: 80 MMHG | HEART RATE: 83 BPM | SYSTOLIC BLOOD PRESSURE: 142 MMHG | WEIGHT: 226 LBS | BODY MASS INDEX: 38.58 KG/M2 | HEIGHT: 64 IN | TEMPERATURE: 98 F

## 2024-12-12 DIAGNOSIS — I10 ESSENTIAL HYPERTENSION: ICD-10-CM

## 2024-12-12 DIAGNOSIS — E11.29 TYPE 2 DIABETES MELLITUS WITH DIABETIC MICROALBUMINURIA, WITHOUT LONG-TERM CURRENT USE OF INSULIN (HCC): ICD-10-CM

## 2024-12-12 DIAGNOSIS — E78.2 MIXED HYPERLIPIDEMIA: ICD-10-CM

## 2024-12-12 DIAGNOSIS — R80.9 TYPE 2 DIABETES MELLITUS WITH DIABETIC MICROALBUMINURIA, WITHOUT LONG-TERM CURRENT USE OF INSULIN (HCC): ICD-10-CM

## 2024-12-12 DIAGNOSIS — Z00.00 PHYSICAL EXAM, ANNUAL: ICD-10-CM

## 2024-12-12 LAB
HBA1C MFR BLD: 8 % (ref ?–5.8)
POCT INT CON NEG: NEGATIVE
POCT INT CON POS: POSITIVE

## 2024-12-12 PROCEDURE — 83036 HEMOGLOBIN GLYCOSYLATED A1C: CPT | Performed by: PHYSICIAN ASSISTANT

## 2024-12-12 PROCEDURE — 3079F DIAST BP 80-89 MM HG: CPT | Performed by: PHYSICIAN ASSISTANT

## 2024-12-12 PROCEDURE — 99214 OFFICE O/P EST MOD 30 MIN: CPT | Performed by: PHYSICIAN ASSISTANT

## 2024-12-12 PROCEDURE — 3077F SYST BP >= 140 MM HG: CPT | Performed by: PHYSICIAN ASSISTANT

## 2024-12-12 ASSESSMENT — FIBROSIS 4 INDEX: FIB4 SCORE: 0.94

## 2024-12-13 DIAGNOSIS — E11.9 TYPE 2 DIABETES MELLITUS WITHOUT COMPLICATION, WITHOUT LONG-TERM CURRENT USE OF INSULIN (HCC): ICD-10-CM

## 2024-12-13 NOTE — PROGRESS NOTES
"CC:  Chief Complaint   Patient presents with    Diabetes Follow-up       HISTORY OF PRESENT ILLNESS: Patient is a 64 y.o. male established patient presenting with issues below  Pt's A1C at 8%. Has not been taking his metformin regularly and has gained some weight.   HTN adequately controlled.    Current Outpatient Medications   Medication Sig Dispense Refill    losartan-hydrochlorothiazide (HYZAAR) 100-12.5 MG per tablet Take 1 Tablet by mouth every day. 90 Tablet 1    metFORMIN (GLUCOPHAGE) 500 MG Tab TAKE 1 TABLET BY MOUTH TWICE DAILY WITH MEALS 180 Tablet 0    atorvastatin (LIPITOR) 40 MG Tab Take 1 Tablet by mouth every day. 90 Tablet 3    amLODIPine (NORVASC) 10 MG Tab Take 1 Tablet by mouth every day. 90 Tablet 3    aspirin 81 MG EC tablet Take 81 mg by mouth every day.      Multiple Minerals (CALCIUM-MAGNESIUM-ZINC) Tab Take  by mouth.      Cholecalciferol (VITAMIN D3 PO) Take  by mouth. 650% of daily allowance      POTASSIUM PO Take  by mouth. With calcium and zinc      Multiple Vitamins-Minerals (CENTRUM SILVER 50+MEN PO) Take  by mouth.      GLUCOSAMINE SULFATE PO Take  by mouth.       No current facility-administered medications for this visit.        ROS:     ROS    Exam:    BP (!) 142/80   Pulse 83   Temp 36.7 °C (98 °F) (Temporal)   Resp 16   Ht 1.626 m (5' 4\")   Wt 103 kg (226 lb)   SpO2 94%  Body mass index is 38.79 kg/m².    General:  Well nourished, well developed male in NAD  Head is grossly normal.  Neck: Supple.   Pulmonary: Clear to auscultation. No ronchi, wheezing or rales  Cardiac: Regular rate and rhythm. No murmurs.  Skin: Warm and dry. No obvious lesions  Neuro: Normal muscle tone. Gait normal. Alert and oriented.  Psych: Normal mood and affect      Please note that this dictation was created using voice recognition software. I have made every reasonable attempt to correct obvious errors, but I expect that there are errors of grammar and possibly content that I did not discover " before finalizing the note.        Assessment/Plan:    1. Type 2 diabetes mellitus with diabetic microalbuminuria, without long-term current use of insulin (HCC)  Advised to restart on metformin  - POCT A1C  - MICROALBUMIN CREAT RATIO URINE; Future    2. Essential hypertension  Continue losartan hctz 100/12.5mg    3. Mixed hyperlipidemia  Continue lipitor 40mg    4. Physical exam, annual  Labs printed  - CBC WITH DIFFERENTIAL; Future  - Comp Metabolic Panel; Future  - Lipid Profile; Future

## 2024-12-13 NOTE — TELEPHONE ENCOUNTER
Received request via: Pharmacy    Was the patient seen in the last year in this department? Yes    Does the patient have an active prescription (recently filled or refills available) for medication(s) requested? No    [facility] Name[facility]     Does the patient have [facility] Plus and need 100-day supply? (This applies to ALL medications) Patient does not have SCP

## 2025-03-16 DIAGNOSIS — E11.9 TYPE 2 DIABETES MELLITUS WITHOUT COMPLICATION, WITHOUT LONG-TERM CURRENT USE OF INSULIN (HCC): ICD-10-CM

## 2025-03-16 DIAGNOSIS — I10 ESSENTIAL HYPERTENSION: ICD-10-CM

## 2025-03-17 RX ORDER — LOSARTAN POTASSIUM AND HYDROCHLOROTHIAZIDE 12.5; 1 MG/1; MG/1
1 TABLET ORAL DAILY
Qty: 90 TABLET | Refills: 1 | Status: SHIPPED | OUTPATIENT
Start: 2025-03-17

## 2025-03-17 RX ORDER — AMLODIPINE BESYLATE 10 MG/1
10 TABLET ORAL
Qty: 90 TABLET | Refills: 3 | Status: SHIPPED | OUTPATIENT
Start: 2025-03-17

## 2025-03-17 NOTE — TELEPHONE ENCOUNTER
Received request via: Pharmacy    Was the patient seen in the last year in this department? Yes    Does the patient have an active prescription (recently filled or refills available) for medication(s) requested? No    Ph[facility] Name[facility]     Does the patient have skilled nursing Plus and need 100-day supply? (This applies to ALL medications) Patient does not have SCP

## 2025-03-18 DIAGNOSIS — E78.5 HYPERLIPIDEMIA, UNSPECIFIED HYPERLIPIDEMIA TYPE: ICD-10-CM

## 2025-03-18 RX ORDER — ATORVASTATIN CALCIUM 40 MG/1
40 TABLET, FILM COATED ORAL DAILY
Qty: 90 TABLET | Refills: 3 | Status: SHIPPED | OUTPATIENT
Start: 2025-03-18

## 2025-05-31 ENCOUNTER — HOSPITAL ENCOUNTER (OUTPATIENT)
Dept: LAB | Facility: MEDICAL CENTER | Age: 65
End: 2025-05-31
Attending: PHYSICIAN ASSISTANT
Payer: COMMERCIAL

## 2025-05-31 DIAGNOSIS — E11.29 TYPE 2 DIABETES MELLITUS WITH DIABETIC MICROALBUMINURIA, WITHOUT LONG-TERM CURRENT USE OF INSULIN (HCC): ICD-10-CM

## 2025-05-31 DIAGNOSIS — Z00.00 PHYSICAL EXAM, ANNUAL: ICD-10-CM

## 2025-05-31 DIAGNOSIS — R80.9 TYPE 2 DIABETES MELLITUS WITH DIABETIC MICROALBUMINURIA, WITHOUT LONG-TERM CURRENT USE OF INSULIN (HCC): ICD-10-CM

## 2025-05-31 LAB
ALBUMIN SERPL BCP-MCNC: 4.4 G/DL (ref 3.2–4.9)
ALBUMIN/GLOB SERPL: 1.6 G/DL
ALP SERPL-CCNC: 65 U/L (ref 30–99)
ALT SERPL-CCNC: 39 U/L (ref 2–50)
ANION GAP SERPL CALC-SCNC: 13 MMOL/L (ref 7–16)
AST SERPL-CCNC: 33 U/L (ref 12–45)
BASOPHILS # BLD AUTO: 0.8 % (ref 0–1.8)
BASOPHILS # BLD: 0.06 K/UL (ref 0–0.12)
BILIRUB SERPL-MCNC: 0.7 MG/DL (ref 0.1–1.5)
BUN SERPL-MCNC: 13 MG/DL (ref 8–22)
CALCIUM ALBUM COR SERPL-MCNC: 8.8 MG/DL (ref 8.5–10.5)
CALCIUM SERPL-MCNC: 9.1 MG/DL (ref 8.5–10.5)
CHLORIDE SERPL-SCNC: 100 MMOL/L (ref 96–112)
CHOLEST SERPL-MCNC: 231 MG/DL (ref 100–199)
CO2 SERPL-SCNC: 23 MMOL/L (ref 20–33)
CREAT SERPL-MCNC: 0.95 MG/DL (ref 0.5–1.4)
CREAT UR-MCNC: 101 MG/DL
EOSINOPHIL # BLD AUTO: 0.13 K/UL (ref 0–0.51)
EOSINOPHIL NFR BLD: 1.7 % (ref 0–6.9)
ERYTHROCYTE [DISTWIDTH] IN BLOOD BY AUTOMATED COUNT: 41.9 FL (ref 35.9–50)
FASTING STATUS PATIENT QL REPORTED: NORMAL
GFR SERPLBLD CREATININE-BSD FMLA CKD-EPI: 90 ML/MIN/1.73 M 2
GLOBULIN SER CALC-MCNC: 2.8 G/DL (ref 1.9–3.5)
GLUCOSE SERPL-MCNC: 232 MG/DL (ref 65–99)
HCT VFR BLD AUTO: 46.4 % (ref 42–52)
HDLC SERPL-MCNC: 38 MG/DL
HGB BLD-MCNC: 16.2 G/DL (ref 14–18)
IMM GRANULOCYTES # BLD AUTO: 0.02 K/UL (ref 0–0.11)
IMM GRANULOCYTES NFR BLD AUTO: 0.3 % (ref 0–0.9)
LDLC SERPL CALC-MCNC: 158 MG/DL
LYMPHOCYTES # BLD AUTO: 1.43 K/UL (ref 1–4.8)
LYMPHOCYTES NFR BLD: 19.1 % (ref 22–41)
MCH RBC QN AUTO: 30.2 PG (ref 27–33)
MCHC RBC AUTO-ENTMCNC: 34.9 G/DL (ref 32.3–36.5)
MCV RBC AUTO: 86.6 FL (ref 81.4–97.8)
MICROALBUMIN UR-MCNC: 3.1 MG/DL
MICROALBUMIN/CREAT UR: 31 MG/G (ref 0–30)
MONOCYTES # BLD AUTO: 0.65 K/UL (ref 0–0.85)
MONOCYTES NFR BLD AUTO: 8.7 % (ref 0–13.4)
NEUTROPHILS # BLD AUTO: 5.18 K/UL (ref 1.82–7.42)
NEUTROPHILS NFR BLD: 69.4 % (ref 44–72)
NRBC # BLD AUTO: 0 K/UL
NRBC BLD-RTO: 0 /100 WBC (ref 0–0.2)
PLATELET # BLD AUTO: 298 K/UL (ref 164–446)
PMV BLD AUTO: 10.4 FL (ref 9–12.9)
POTASSIUM SERPL-SCNC: 4 MMOL/L (ref 3.6–5.5)
PROT SERPL-MCNC: 7.2 G/DL (ref 6–8.2)
RBC # BLD AUTO: 5.36 M/UL (ref 4.7–6.1)
SODIUM SERPL-SCNC: 136 MMOL/L (ref 135–145)
TRIGL SERPL-MCNC: 175 MG/DL (ref 0–149)
WBC # BLD AUTO: 7.5 K/UL (ref 4.8–10.8)

## 2025-05-31 PROCEDURE — 36415 COLL VENOUS BLD VENIPUNCTURE: CPT

## 2025-05-31 PROCEDURE — 82570 ASSAY OF URINE CREATININE: CPT

## 2025-05-31 PROCEDURE — 80061 LIPID PANEL: CPT

## 2025-05-31 PROCEDURE — 80053 COMPREHEN METABOLIC PANEL: CPT

## 2025-05-31 PROCEDURE — 85025 COMPLETE CBC W/AUTO DIFF WBC: CPT

## 2025-05-31 PROCEDURE — 82043 UR ALBUMIN QUANTITATIVE: CPT

## 2025-06-12 ENCOUNTER — APPOINTMENT (OUTPATIENT)
Dept: MEDICAL GROUP | Facility: PHYSICIAN GROUP | Age: 65
End: 2025-06-12
Payer: COMMERCIAL

## 2025-06-18 ENCOUNTER — OFFICE VISIT (OUTPATIENT)
Dept: MEDICAL GROUP | Facility: PHYSICIAN GROUP | Age: 65
End: 2025-06-18
Payer: COMMERCIAL

## 2025-06-18 VITALS
RESPIRATION RATE: 16 BRPM | DIASTOLIC BLOOD PRESSURE: 70 MMHG | HEIGHT: 64 IN | BODY MASS INDEX: 38.76 KG/M2 | SYSTOLIC BLOOD PRESSURE: 116 MMHG | OXYGEN SATURATION: 92 % | TEMPERATURE: 98.3 F | HEART RATE: 68 BPM | WEIGHT: 227 LBS

## 2025-06-18 DIAGNOSIS — E66.09 CLASS 2 OBESITY DUE TO EXCESS CALORIES WITHOUT SERIOUS COMORBIDITY WITH BODY MASS INDEX (BMI) OF 38.0 TO 38.9 IN ADULT: ICD-10-CM

## 2025-06-18 DIAGNOSIS — I10 ESSENTIAL HYPERTENSION: ICD-10-CM

## 2025-06-18 DIAGNOSIS — R80.9 TYPE 2 DIABETES MELLITUS WITH DIABETIC MICROALBUMINURIA, WITHOUT LONG-TERM CURRENT USE OF INSULIN (HCC): Primary | ICD-10-CM

## 2025-06-18 DIAGNOSIS — E11.29 TYPE 2 DIABETES MELLITUS WITH DIABETIC MICROALBUMINURIA, WITHOUT LONG-TERM CURRENT USE OF INSULIN (HCC): Primary | ICD-10-CM

## 2025-06-18 DIAGNOSIS — E78.2 MIXED HYPERLIPIDEMIA: ICD-10-CM

## 2025-06-18 DIAGNOSIS — E66.812 CLASS 2 OBESITY DUE TO EXCESS CALORIES WITHOUT SERIOUS COMORBIDITY WITH BODY MASS INDEX (BMI) OF 38.0 TO 38.9 IN ADULT: ICD-10-CM

## 2025-06-18 DIAGNOSIS — Z00.00 PHYSICAL EXAM, ANNUAL: ICD-10-CM

## 2025-06-18 LAB
HBA1C MFR BLD: 9 % (ref ?–5.8)
POCT INT CON NEG: NEGATIVE
POCT INT CON POS: POSITIVE

## 2025-06-18 PROCEDURE — 3078F DIAST BP <80 MM HG: CPT | Performed by: PHYSICIAN ASSISTANT

## 2025-06-18 PROCEDURE — 83036 HEMOGLOBIN GLYCOSYLATED A1C: CPT | Performed by: PHYSICIAN ASSISTANT

## 2025-06-18 PROCEDURE — 3074F SYST BP LT 130 MM HG: CPT | Performed by: PHYSICIAN ASSISTANT

## 2025-06-18 PROCEDURE — 99396 PREV VISIT EST AGE 40-64: CPT | Performed by: PHYSICIAN ASSISTANT

## 2025-06-18 ASSESSMENT — FIBROSIS 4 INDEX: FIB4 SCORE: 1.12

## 2025-06-18 NOTE — PROGRESS NOTES
"CC:    Chief Complaint   Patient presents with    Annual Exam     PE        HISTORY OF THE PRESENT ILLNESS:  65 y.o. male presenting for annual physical exam.   His hemoglobin A1c today at 9%. Has not been taking his metformin regularly and state he is taking it sporadically.   Pt's lipids elevated. Has not been taking his lipitor.   Htn well controlled.       No problem-specific Assessment & Plan notes found for this encounter.    Allergies: Lisinopril    Current Medications and Prescriptions Ordered in Epic[1]    Past Medical History[2]    Past Surgical History[3]    Social History[4]    Social History     Social History Narrative    Not on file       Family History   Problem Relation Age of Onset    Heart Disease Mother     Cancer Father         bone, liver, skin, Lung (initial)    Stroke Brother     Heart Disease Maternal Aunt     Heart Disease Maternal Uncle     Sleep Apnea Neg Hx        ROS:     - Constitutional: Negative for fever, chills, unexpected weight change, and fatigue/generalized weakness.     - HEENT: Negative for headaches, vision changes, hearing changes, ear pain, ear discharge, rhinorrhea, sinus congestion, sore throat, and neck pain.      - Respiratory: Negative for cough, sputum production, chest congestion, dyspnea, wheezing, and crackles.      - Cardiovascular: Negative for chest pain, palpitations, orthopnea, and bilateral lower extremity edema.     - Gastrointestinal: Negative for heartburn, nausea, vomiting, abdominal pain, hematochezia, melena, diarrhea, constipation, and greasy/foul-smelling stools.          Health Maintenance  Cholesterol Screening: Fasting lipid panel  Diabetes Screening: Fasting blood sugar  Exercise: Regular exercise.   Substance Abuse: None       Cancer screening  Colorectal Cancer Screenin         Exam: /70   Pulse 68   Temp 36.8 °C (98.3 °F) (Temporal)   Resp 16   Ht 1.626 m (5' 4\")   Wt 103 kg (227 lb)   SpO2 92%  Body mass index is 38.96 " kg/m².    General: Normal appearing. No distress.  HEENT: Normocephalic. Eyes conjunctiva clear lids without ptosis, pupils equal and reactive to light accommodation, ears normal shape and contour, canals are clear bilaterally, tympanic membranes are benign, nasal mucosa benign, oropharynx is without erythema, edema or exudates.   Neck: Supple without JVD or bruit. No thyromegaly. No cervical lymphadenopathy  Pulmonary: Clear to ausculation.  Normal effort. No rales, ronchi, or wheezing.  Cardiovascular: Regular rate and rhythm without murmur, gallops or rubs  Neurologic: Grossly nonfocal, gait normal, normal muscle tone  Skin: Warm and dry.  No obvious lesions.  Musculoskeletal: No extremity cyanosis, clubbing, or edema. No deformity  Psych: Normal mood and affect. Alert and oriented x3. Judgment and insight is normal.    Please note that this dictation was created using voice recognition software. I have made every reasonable attempt to correct obvious errors, but I expect that there are errors of grammar and possibly content that I did not discover before finalizing the note.      Monofilament testing with a 10 gram force: sensation intact: intact bilaterally  Visual Inspection: Feet without maceration, ulcers, fissures.  Pedal pulses: intact bilaterally    Assessment/Plan    1. Type 2 diabetes mellitus with diabetic microalbuminuria, without long-term current use of insulin (HCC) (Primary)  I advised he needs to start taking his metformin. Will recheck A1C in three months and assess if dose is appropriate.   - Diabetic Monofilament LE Exam  - POCT A1C    2. Physical exam, annual  PE conducted    3. Essential hypertension  Well controlled.     4. Mixed hyperlipidemia  Needs to start taking his lipitor again.     5. Class 2 obesity due to excess calories without serious comorbidity with body mass index (BMI) of 38.0 to 38.9 in adult           [1]   Current Outpatient Medications Ordered in Epic   Medication Sig  Dispense Refill    atorvastatin (LIPITOR) 40 MG Tab Take 1 Tablet by mouth every day. 90 Tablet 3    amLODIPine (NORVASC) 10 MG Tab TAKE 1 TABLET BY MOUTH EVERY DAY 90 Tablet 3    losartan-hydrochlorothiazide (HYZAAR) 100-12.5 MG per tablet TAKE 1 TABLET BY MOUTH EVERY DAY 90 Tablet 1    metFORMIN (GLUCOPHAGE) 500 MG Tab TAKE 1 TABLET BY MOUTH TWICE DAILY WITH MEALS 180 Tablet 0    aspirin 81 MG EC tablet Take 81 mg by mouth every day.      Multiple Minerals (CALCIUM-MAGNESIUM-ZINC) Tab Take  by mouth.      Cholecalciferol (VITAMIN D3 PO) Take  by mouth. 650% of daily allowance      POTASSIUM PO Take  by mouth. With calcium and zinc      Multiple Vitamins-Minerals (CENTRUM SILVER 50+MEN PO) Take  by mouth.      GLUCOSAMINE SULFATE PO Take  by mouth.       No current Epic-ordered facility-administered medications on file.   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 01/09/2015   [3]   Past Surgical History:  Procedure Laterality Date    CATARACT EXTRACTION WITH IOL Bilateral 2018    SHOULDER ARTHROSCOPY  5/20/2010    [facility]    DEBRIDEMENT  5/20/2010    Performed by XIMENA KANG at SURGERY SAME DAY [facility] ORS    ROTATOR CUFF REPAIR  5/20/2010    Performed by XIMENA KANG at SURGERY SAME DAY [facility] ORS   [4]   Social History  Tobacco Use    Smoking status: Never     Passive exposure: Yes    Smokeless tobacco: Never   Vaping Use    Vaping status: Never Used   Substance Use Topics    Alcohol use: Yes     Alcohol/week: 0.6 oz     Types: 1 Glasses of wine per week     Comment: seldom    Drug use: No